# Patient Record
Sex: MALE | Race: WHITE | NOT HISPANIC OR LATINO
[De-identification: names, ages, dates, MRNs, and addresses within clinical notes are randomized per-mention and may not be internally consistent; named-entity substitution may affect disease eponyms.]

---

## 2017-08-29 ENCOUNTER — APPOINTMENT (OUTPATIENT)
Dept: INTERNAL MEDICINE | Facility: CLINIC | Age: 42
End: 2017-08-29
Payer: COMMERCIAL

## 2017-08-29 ENCOUNTER — NON-APPOINTMENT (OUTPATIENT)
Age: 42
End: 2017-08-29

## 2017-08-29 VITALS
HEIGHT: 71 IN | OXYGEN SATURATION: 98 % | SYSTOLIC BLOOD PRESSURE: 110 MMHG | HEART RATE: 86 BPM | TEMPERATURE: 97.8 F | BODY MASS INDEX: 25.62 KG/M2 | DIASTOLIC BLOOD PRESSURE: 60 MMHG | WEIGHT: 183 LBS

## 2017-08-29 DIAGNOSIS — E80.4 GILBERT SYNDROME: ICD-10-CM

## 2017-08-29 DIAGNOSIS — Z23 ENCOUNTER FOR IMMUNIZATION: ICD-10-CM

## 2017-08-29 PROCEDURE — 36415 COLL VENOUS BLD VENIPUNCTURE: CPT

## 2017-08-29 PROCEDURE — 93000 ELECTROCARDIOGRAM COMPLETE: CPT

## 2017-08-29 PROCEDURE — 82270 OCCULT BLOOD FECES: CPT

## 2017-08-29 PROCEDURE — 99396 PREV VISIT EST AGE 40-64: CPT | Mod: 25

## 2017-09-08 ENCOUNTER — TRANSCRIPTION ENCOUNTER (OUTPATIENT)
Age: 42
End: 2017-09-08

## 2017-11-17 ENCOUNTER — APPOINTMENT (OUTPATIENT)
Dept: GASTROENTEROLOGY | Facility: CLINIC | Age: 42
End: 2017-11-17
Payer: COMMERCIAL

## 2017-11-17 VITALS
TEMPERATURE: 97.6 F | RESPIRATION RATE: 16 BRPM | BODY MASS INDEX: 25.62 KG/M2 | HEART RATE: 92 BPM | OXYGEN SATURATION: 98 % | HEIGHT: 71 IN | SYSTOLIC BLOOD PRESSURE: 124 MMHG | WEIGHT: 183 LBS | DIASTOLIC BLOOD PRESSURE: 80 MMHG

## 2017-11-17 PROCEDURE — 99204 OFFICE O/P NEW MOD 45 MIN: CPT

## 2018-01-08 ENCOUNTER — CHART COPY (OUTPATIENT)
Age: 43
End: 2018-01-08

## 2018-01-10 ENCOUNTER — APPOINTMENT (OUTPATIENT)
Dept: GASTROENTEROLOGY | Facility: AMBULATORY MEDICAL SERVICES | Age: 43
End: 2018-01-10
Payer: COMMERCIAL

## 2018-01-10 PROCEDURE — 45385 COLONOSCOPY W/LESION REMOVAL: CPT

## 2018-02-06 LAB
25(OH)D3 SERPL-MCNC: 33.2 NG/ML
ALBUMIN SERPL ELPH-MCNC: 4.5 G/DL
ALP BLD-CCNC: 57 U/L
ALT SERPL-CCNC: 46 U/L
ANION GAP SERPL CALC-SCNC: 12 MMOL/L
APPEARANCE: CLEAR
AST SERPL-CCNC: 35 U/L
BACTERIA: NEGATIVE
BASOPHILS # BLD AUTO: 0.03 K/UL
BASOPHILS NFR BLD AUTO: 0.4 %
BILIRUB SERPL-MCNC: 0.8 MG/DL
BILIRUBIN URINE: NEGATIVE
BLOOD URINE: NEGATIVE
BUN SERPL-MCNC: 17 MG/DL
CALCIUM SERPL-MCNC: 8.9 MG/DL
CHLORIDE SERPL-SCNC: 101 MMOL/L
CHOLEST SERPL-MCNC: 231 MG/DL
CHOLEST/HDLC SERPL: 3.4 RATIO
CO2 SERPL-SCNC: 25 MMOL/L
COLOR: YELLOW
CREAT SERPL-MCNC: 1.16 MG/DL
EOSINOPHIL # BLD AUTO: 0.12 K/UL
EOSINOPHIL NFR BLD AUTO: 1.7 %
GLUCOSE QUALITATIVE U: NORMAL MG/DL
GLUCOSE SERPL-MCNC: 93 MG/DL
HBA1C MFR BLD HPLC: 5.4 %
HCT VFR BLD CALC: 43.1 %
HDLC SERPL-MCNC: 67 MG/DL
HGB BLD-MCNC: 14 G/DL
IMM GRANULOCYTES NFR BLD AUTO: 0.3 %
KETONES URINE: NEGATIVE
LDLC SERPL CALC-MCNC: 124 MG/DL
LEUKOCYTE ESTERASE URINE: NEGATIVE
LYMPHOCYTES # BLD AUTO: 2.37 K/UL
LYMPHOCYTES NFR BLD AUTO: 33.1 %
MAN DIFF?: NORMAL
MCHC RBC-ENTMCNC: 29.9 PG
MCHC RBC-ENTMCNC: 32.5 GM/DL
MCV RBC AUTO: 92.1 FL
MICROSCOPIC-UA: NORMAL
MONOCYTES # BLD AUTO: 0.45 K/UL
MONOCYTES NFR BLD AUTO: 6.3 %
NEUTROPHILS # BLD AUTO: 4.16 K/UL
NEUTROPHILS NFR BLD AUTO: 58.2 %
NITRITE URINE: NEGATIVE
PH URINE: 6.5
PLATELET # BLD AUTO: 279 K/UL
POTASSIUM SERPL-SCNC: 3.7 MMOL/L
PROT SERPL-MCNC: 7.3 G/DL
PROTEIN URINE: NEGATIVE MG/DL
RBC # BLD: 4.68 M/UL
RBC # FLD: 13.1 %
RED BLOOD CELLS URINE: 1 /HPF
SODIUM SERPL-SCNC: 138 MMOL/L
SPECIFIC GRAVITY URINE: 1.01
SQUAMOUS EPITHELIAL CELLS: 0 /HPF
T4 FREE SERPL-MCNC: 1.3 NG/DL
TRIGL SERPL-MCNC: 200 MG/DL
TSH SERPL-ACNC: 1.89 UIU/ML
UROBILINOGEN URINE: NORMAL MG/DL
WBC # FLD AUTO: 7.15 K/UL
WHITE BLOOD CELLS URINE: 0 /HPF

## 2019-01-01 ENCOUNTER — TRANSCRIPTION ENCOUNTER (OUTPATIENT)
Age: 44
End: 2019-01-01

## 2019-01-07 ENCOUNTER — TRANSCRIPTION ENCOUNTER (OUTPATIENT)
Age: 44
End: 2019-01-07

## 2019-02-13 ENCOUNTER — OUTPATIENT (OUTPATIENT)
Dept: OUTPATIENT SERVICES | Facility: HOSPITAL | Age: 44
LOS: 1 days | End: 2019-02-13
Payer: COMMERCIAL

## 2019-02-13 ENCOUNTER — APPOINTMENT (OUTPATIENT)
Dept: MRI IMAGING | Facility: CLINIC | Age: 44
End: 2019-02-13
Payer: COMMERCIAL

## 2019-02-13 DIAGNOSIS — Z00.8 ENCOUNTER FOR OTHER GENERAL EXAMINATION: ICD-10-CM

## 2019-02-13 PROCEDURE — 72141 MRI NECK SPINE W/O DYE: CPT | Mod: 26

## 2019-02-13 PROCEDURE — 72141 MRI NECK SPINE W/O DYE: CPT

## 2019-07-30 ENCOUNTER — APPOINTMENT (OUTPATIENT)
Dept: INTERNAL MEDICINE | Facility: CLINIC | Age: 44
End: 2019-07-30
Payer: COMMERCIAL

## 2019-07-30 VITALS
SYSTOLIC BLOOD PRESSURE: 124 MMHG | HEART RATE: 74 BPM | TEMPERATURE: 98.3 F | DIASTOLIC BLOOD PRESSURE: 86 MMHG | HEIGHT: 71 IN | OXYGEN SATURATION: 98 % | BODY MASS INDEX: 23.24 KG/M2 | WEIGHT: 166 LBS

## 2019-07-30 DIAGNOSIS — K35.32 ACUTE APPENDICITIS W/ PERFORATION AND LOCALIZED PERITONITIS, W/O ABSCESS: ICD-10-CM

## 2019-07-30 PROCEDURE — 99214 OFFICE O/P EST MOD 30 MIN: CPT | Mod: 25

## 2019-07-30 PROCEDURE — 36415 COLL VENOUS BLD VENIPUNCTURE: CPT

## 2019-07-30 RX ORDER — SODIUM SULFATE, POTASSIUM SULFATE, MAGNESIUM SULFATE 17.5; 3.13; 1.6 G/ML; G/ML; G/ML
17.5-3.13-1.6 SOLUTION, CONCENTRATE ORAL
Qty: 1 | Refills: 0 | Status: DISCONTINUED | COMMUNITY
Start: 2017-11-17 | End: 2019-07-30

## 2019-07-31 LAB
ALBUMIN SERPL ELPH-MCNC: 4.6 G/DL
ALP BLD-CCNC: 705 U/L
ALT SERPL-CCNC: 119 U/L
ANION GAP SERPL CALC-SCNC: 15 MMOL/L
AST SERPL-CCNC: 63 U/L
BASOPHILS # BLD AUTO: 0.07 K/UL
BASOPHILS NFR BLD AUTO: 0.5 %
BILIRUB SERPL-MCNC: 1.1 MG/DL
BUN SERPL-MCNC: 16 MG/DL
CALCIUM SERPL-MCNC: 10.1 MG/DL
CHLORIDE SERPL-SCNC: 97 MMOL/L
CO2 SERPL-SCNC: 25 MMOL/L
CREAT SERPL-MCNC: 1.09 MG/DL
EOSINOPHIL # BLD AUTO: 0.05 K/UL
EOSINOPHIL NFR BLD AUTO: 0.4 %
GLUCOSE SERPL-MCNC: 100 MG/DL
HCT VFR BLD CALC: 44.8 %
HGB BLD-MCNC: 14.1 G/DL
IMM GRANULOCYTES NFR BLD AUTO: 1.8 %
LYMPHOCYTES # BLD AUTO: 2.39 K/UL
LYMPHOCYTES NFR BLD AUTO: 17 %
MAN DIFF?: NORMAL
MCHC RBC-ENTMCNC: 29.7 PG
MCHC RBC-ENTMCNC: 31.5 GM/DL
MCV RBC AUTO: 94.5 FL
MONOCYTES # BLD AUTO: 0.75 K/UL
MONOCYTES NFR BLD AUTO: 5.3 %
NEUTROPHILS # BLD AUTO: 10.51 K/UL
NEUTROPHILS NFR BLD AUTO: 75 %
PLATELET # BLD AUTO: 449 K/UL
POTASSIUM SERPL-SCNC: 5.3 MMOL/L
PROT SERPL-MCNC: 7.8 G/DL
RBC # BLD: 4.74 M/UL
RBC # FLD: 13.8 %
SODIUM SERPL-SCNC: 137 MMOL/L
WBC # FLD AUTO: 14.02 K/UL

## 2019-07-31 NOTE — HISTORY OF PRESENT ILLNESS
[FreeTextEntry1] : Hospital follow-up [de-identified] : Patient was hospitalized in [place] ([facility]) from 7/20-7/24 for acute appendicitis s/p laparoscopic appendectomy. He initially developed severe periumbilical and RLQ abdominal pain. He had a ruptured appendix. He had a EVELINA drain placed and was treated with IV antibiotics. Pain was controlled with Dilaudid and Toradol. He was discharged home on Augmentin - to complete course tomorrow. He needs to have abdominal wall staples and sutures removed today. Was advised to have them removed 7-10 days after surgery. He feels better now. His abdominal pain has vastly improved although still has occasional pain in RLQ. His appetite has improved. He lost some weight since hospitalization. He has not had any evidence of abdominal wall infection. He is having normal bowel movements.

## 2019-07-31 NOTE — ASSESSMENT
[FreeTextEntry1] : Patient hospitalized in [place] with ruptured appendicitis s/p laparoscopic appendectomy on 7/20. He was discharged home on 7/24 and is now doing well. He will complete course of Augmentin tomorrow. Multiple staples and 1 suture on abdominal wall were successfully removed in office today - area was cleaned with alcohol pad and hydrogen peroxide. No need to keep areas bandaged. He will monitor for signs and symptoms of wound infection. Check CBC and CMP. Call office PRN.

## 2019-07-31 NOTE — HISTORY OF PRESENT ILLNESS
[FreeTextEntry1] : Hospital follow-up [de-identified] : Patient was hospitalized in [place] ([facility]) from 7/20-7/24 for acute appendicitis s/p laparoscopic appendectomy. He initially developed severe periumbilical and RLQ abdominal pain. He had a ruptured appendix. He had a EVELINA drain placed and was treated with IV antibiotics. Pain was controlled with Dilaudid and Toradol. He was discharged home on Augmentin - to complete course tomorrow. He needs to have abdominal wall staples and sutures removed today. Was advised to have them removed 7-10 days after surgery. He feels better now. His abdominal pain has vastly improved although still has occasional pain in RLQ. His appetite has improved. He lost some weight since hospitalization. He has not had any evidence of abdominal wall infection. He is having normal bowel movements.

## 2019-07-31 NOTE — REVIEW OF SYSTEMS
[Recent Change In Weight] : ~T recent weight change [Negative] : Respiratory [Fever] : no fever [Chills] : no chills [Fatigue] : no fatigue [Earache] : no earache [Nasal Discharge] : no nasal discharge [Sore Throat] : no sore throat [Chest Pain] : no chest pain [Palpitations] : no palpitations [Lower Ext Edema] : no lower extremity edema [Abdominal Pain] : no abdominal pain [Nausea] : no nausea [Constipation] : no constipation [Diarrhea] : no diarrhea [Heartburn] : no heartburn [Melena] : no melena [Dysuria] : no dysuria [Frequency] : no frequency [Hematuria] : no hematuria [Back Pain] : no back pain [Skin Rash] : no skin rash [Itching] : no itching [Dizziness] : no dizziness [Headache] : no headache [Unsteady Walk] : no ataxia [FreeTextEntry2] : lost weight

## 2019-07-31 NOTE — PHYSICAL EXAM
[No Acute Distress] : no acute distress [Normal] : normal rate, regular rhythm, normal S1 and S2 and no murmur heard [No Edema] : there was no peripheral edema [Non Tender] : non-tender [Non-distended] : non-distended [Soft] : abdomen soft [No HSM] : no HSM [Normal Bowel Sounds] : normal bowel sounds [No Rash] : no rash [Normal Gait] : normal gait [Normal Affect] : the affect was normal [Normal Mood] : the mood was normal [de-identified] : multiple staples place on abdominal wall especially near umbilicus, 1 suture placed in RLQ

## 2019-07-31 NOTE — REVIEW OF SYSTEMS
[Recent Change In Weight] : ~T recent weight change [Negative] : Respiratory [Fever] : no fever [Chills] : no chills [Fatigue] : no fatigue [Earache] : no earache [Nasal Discharge] : no nasal discharge [Sore Throat] : no sore throat [Chest Pain] : no chest pain [Palpitations] : no palpitations [Lower Ext Edema] : no lower extremity edema [Abdominal Pain] : no abdominal pain [Nausea] : no nausea [Constipation] : no constipation [Diarrhea] : no diarrhea [Melena] : no melena [Heartburn] : no heartburn [Dysuria] : no dysuria [Hematuria] : no hematuria [Frequency] : no frequency [Back Pain] : no back pain [Skin Rash] : no skin rash [Itching] : no itching [Dizziness] : no dizziness [Headache] : no headache [Unsteady Walk] : no ataxia [FreeTextEntry2] : lost weight

## 2019-07-31 NOTE — PHYSICAL EXAM
[No Acute Distress] : no acute distress [No Edema] : there was no peripheral edema [Normal] : normal rate, regular rhythm, normal S1 and S2 and no murmur heard [Non Tender] : non-tender [Non-distended] : non-distended [Soft] : abdomen soft [Normal Bowel Sounds] : normal bowel sounds [No HSM] : no HSM [No Rash] : no rash [Normal Gait] : normal gait [Normal Mood] : the mood was normal [Normal Affect] : the affect was normal [de-identified] : multiple staples place on abdominal wall especially near umbilicus, 1 suture placed in RLQ

## 2019-08-01 ENCOUNTER — OUTPATIENT (OUTPATIENT)
Dept: OUTPATIENT SERVICES | Facility: HOSPITAL | Age: 44
LOS: 1 days | End: 2019-08-01
Payer: COMMERCIAL

## 2019-08-01 ENCOUNTER — APPOINTMENT (OUTPATIENT)
Dept: CT IMAGING | Facility: CLINIC | Age: 44
End: 2019-08-01
Payer: COMMERCIAL

## 2019-08-01 ENCOUNTER — TRANSCRIPTION ENCOUNTER (OUTPATIENT)
Age: 44
End: 2019-08-01

## 2019-08-01 ENCOUNTER — INPATIENT (INPATIENT)
Facility: HOSPITAL | Age: 44
LOS: 3 days | Discharge: HOME | DRG: 443 | End: 2019-08-05
Attending: SURGERY | Admitting: SURGERY
Payer: COMMERCIAL

## 2019-08-01 VITALS
OXYGEN SATURATION: 99 % | SYSTOLIC BLOOD PRESSURE: 122 MMHG | DIASTOLIC BLOOD PRESSURE: 73 MMHG | WEIGHT: 164.91 LBS | HEART RATE: 66 BPM | RESPIRATION RATE: 17 BRPM | HEIGHT: 71 IN | TEMPERATURE: 98 F

## 2019-08-01 DIAGNOSIS — D72.829 ELEVATED WHITE BLOOD CELL COUNT, UNSPECIFIED: ICD-10-CM

## 2019-08-01 DIAGNOSIS — K35.32 ACUTE APPENDICITIS WITH PERFORATION, LOCALIZED PERITONITIS, AND GANGRENE, WITHOUT ABSCESS: ICD-10-CM

## 2019-08-01 DIAGNOSIS — Z90.49 ACQUIRED ABSENCE OF OTHER SPECIFIED PARTS OF DIGESTIVE TRACT: Chronic | ICD-10-CM

## 2019-08-01 DIAGNOSIS — K75.0 ABSCESS OF LIVER: ICD-10-CM

## 2019-08-01 LAB
ALBUMIN SERPL ELPH-MCNC: 4.3 G/DL — SIGNIFICANT CHANGE UP (ref 3.3–5)
ALP SERPL-CCNC: 557 U/L — HIGH (ref 40–120)
ALT FLD-CCNC: 98 U/L — HIGH (ref 10–45)
ANION GAP SERPL CALC-SCNC: 18 MMOL/L — HIGH (ref 5–17)
AST SERPL-CCNC: 42 U/L — HIGH (ref 10–40)
BASE EXCESS BLDV CALC-SCNC: 2.5 MMOL/L — HIGH (ref -2–2)
BASOPHILS # BLD AUTO: 0.1 K/UL — SIGNIFICANT CHANGE UP (ref 0–0.2)
BASOPHILS NFR BLD AUTO: 0.5 % — SIGNIFICANT CHANGE UP (ref 0–2)
BILIRUB SERPL-MCNC: 0.7 MG/DL — SIGNIFICANT CHANGE UP (ref 0.2–1.2)
BLD GP AB SCN SERPL QL: NEGATIVE — SIGNIFICANT CHANGE UP
BUN SERPL-MCNC: 16 MG/DL — SIGNIFICANT CHANGE UP (ref 7–23)
CA-I SERPL-SCNC: 1.21 MMOL/L — SIGNIFICANT CHANGE UP (ref 1.12–1.3)
CALCIUM SERPL-MCNC: 10.1 MG/DL — SIGNIFICANT CHANGE UP (ref 8.4–10.5)
CHLORIDE BLDV-SCNC: 104 MMOL/L — SIGNIFICANT CHANGE UP (ref 96–108)
CHLORIDE SERPL-SCNC: 97 MMOL/L — SIGNIFICANT CHANGE UP (ref 96–108)
CO2 BLDV-SCNC: 28 MMOL/L — SIGNIFICANT CHANGE UP (ref 22–30)
CO2 SERPL-SCNC: 24 MMOL/L — SIGNIFICANT CHANGE UP (ref 22–31)
CREAT SERPL-MCNC: 0.87 MG/DL — SIGNIFICANT CHANGE UP (ref 0.5–1.3)
EOSINOPHIL # BLD AUTO: 0.1 K/UL — SIGNIFICANT CHANGE UP (ref 0–0.5)
EOSINOPHIL NFR BLD AUTO: 1.1 % — SIGNIFICANT CHANGE UP (ref 0–6)
GAS PNL BLDV: 135 MMOL/L — SIGNIFICANT CHANGE UP (ref 135–145)
GAS PNL BLDV: SIGNIFICANT CHANGE UP
GAS PNL BLDV: SIGNIFICANT CHANGE UP
GLUCOSE BLDV-MCNC: 91 MG/DL — SIGNIFICANT CHANGE UP (ref 70–99)
GLUCOSE SERPL-MCNC: 90 MG/DL — SIGNIFICANT CHANGE UP (ref 70–99)
HCO3 BLDV-SCNC: 26 MMOL/L — SIGNIFICANT CHANGE UP (ref 21–29)
HCT VFR BLD CALC: 41.3 % — SIGNIFICANT CHANGE UP (ref 39–50)
HCT VFR BLDA CALC: 41 % — SIGNIFICANT CHANGE UP (ref 39–50)
HGB BLD CALC-MCNC: 13.3 G/DL — SIGNIFICANT CHANGE UP (ref 13–17)
HGB BLD-MCNC: 13.2 G/DL — SIGNIFICANT CHANGE UP (ref 13–17)
LACTATE BLDV-MCNC: 0.9 MMOL/L — SIGNIFICANT CHANGE UP (ref 0.7–2)
LYMPHOCYTES # BLD AUTO: 28.4 % — SIGNIFICANT CHANGE UP (ref 13–44)
LYMPHOCYTES # BLD AUTO: 3.1 K/UL — SIGNIFICANT CHANGE UP (ref 1–3.3)
MCHC RBC-ENTMCNC: 28.9 PG — SIGNIFICANT CHANGE UP (ref 27–34)
MCHC RBC-ENTMCNC: 32 GM/DL — SIGNIFICANT CHANGE UP (ref 32–36)
MCV RBC AUTO: 90.4 FL — SIGNIFICANT CHANGE UP (ref 80–100)
MONOCYTES # BLD AUTO: 0.6 K/UL — SIGNIFICANT CHANGE UP (ref 0–0.9)
MONOCYTES NFR BLD AUTO: 5.3 % — SIGNIFICANT CHANGE UP (ref 2–14)
NEUTROPHILS # BLD AUTO: 7 K/UL — SIGNIFICANT CHANGE UP (ref 1.8–7.4)
NEUTROPHILS NFR BLD AUTO: 64.8 % — SIGNIFICANT CHANGE UP (ref 43–77)
PCO2 BLDV: 40 MMHG — SIGNIFICANT CHANGE UP (ref 35–50)
PH BLDV: 7.44 — SIGNIFICANT CHANGE UP (ref 7.35–7.45)
PLATELET # BLD AUTO: 502 K/UL — HIGH (ref 150–400)
PO2 BLDV: 42 MMHG — SIGNIFICANT CHANGE UP (ref 25–45)
POTASSIUM BLDV-SCNC: 3.7 MMOL/L — SIGNIFICANT CHANGE UP (ref 3.5–5.3)
POTASSIUM SERPL-MCNC: 3.9 MMOL/L — SIGNIFICANT CHANGE UP (ref 3.5–5.3)
POTASSIUM SERPL-SCNC: 3.9 MMOL/L — SIGNIFICANT CHANGE UP (ref 3.5–5.3)
PROT SERPL-MCNC: 7.9 G/DL — SIGNIFICANT CHANGE UP (ref 6–8.3)
RBC # BLD: 4.57 M/UL — SIGNIFICANT CHANGE UP (ref 4.2–5.8)
RBC # FLD: 12.4 % — SIGNIFICANT CHANGE UP (ref 10.3–14.5)
RH IG SCN BLD-IMP: POSITIVE — SIGNIFICANT CHANGE UP
RH IG SCN BLD-IMP: POSITIVE — SIGNIFICANT CHANGE UP
SAO2 % BLDV: 76 % — SIGNIFICANT CHANGE UP (ref 67–88)
SODIUM SERPL-SCNC: 139 MMOL/L — SIGNIFICANT CHANGE UP (ref 135–145)
WBC # BLD: 10.8 K/UL — HIGH (ref 3.8–10.5)
WBC # FLD AUTO: 10.8 K/UL — HIGH (ref 3.8–10.5)

## 2019-08-01 PROCEDURE — 74177 CT ABD & PELVIS W/CONTRAST: CPT

## 2019-08-01 PROCEDURE — 71045 X-RAY EXAM CHEST 1 VIEW: CPT | Mod: 26

## 2019-08-01 PROCEDURE — 99285 EMERGENCY DEPT VISIT HI MDM: CPT

## 2019-08-01 PROCEDURE — 74177 CT ABD & PELVIS W/CONTRAST: CPT | Mod: 26

## 2019-08-01 RX ORDER — ENOXAPARIN SODIUM 100 MG/ML
40 INJECTION SUBCUTANEOUS DAILY
Refills: 0 | Status: DISCONTINUED | OUTPATIENT
Start: 2019-08-01 | End: 2019-08-01

## 2019-08-01 RX ORDER — DEXTROSE MONOHYDRATE, SODIUM CHLORIDE, AND POTASSIUM CHLORIDE 50; .745; 4.5 G/1000ML; G/1000ML; G/1000ML
1000 INJECTION, SOLUTION INTRAVENOUS
Refills: 0 | Status: DISCONTINUED | OUTPATIENT
Start: 2019-08-01 | End: 2019-08-02

## 2019-08-01 RX ORDER — ERTAPENEM SODIUM 1 G/1
1000 INJECTION, POWDER, LYOPHILIZED, FOR SOLUTION INTRAMUSCULAR; INTRAVENOUS EVERY 24 HOURS
Refills: 0 | Status: DISCONTINUED | OUTPATIENT
Start: 2019-08-01 | End: 2019-08-05

## 2019-08-01 RX ORDER — ERTAPENEM SODIUM 1 G/1
1000 INJECTION, POWDER, LYOPHILIZED, FOR SOLUTION INTRAMUSCULAR; INTRAVENOUS ONCE
Refills: 0 | Status: COMPLETED | OUTPATIENT
Start: 2019-08-01 | End: 2019-08-01

## 2019-08-01 RX ADMIN — ERTAPENEM SODIUM 120 MILLIGRAM(S): 1 INJECTION, POWDER, LYOPHILIZED, FOR SOLUTION INTRAMUSCULAR; INTRAVENOUS at 23:39

## 2019-08-01 NOTE — ED ADULT TRIAGE NOTE - CHIEF COMPLAINT QUOTE
CT scan showed liver abscess  had appendic rupture in [place] 2 weeks ago and was following up with MD and had CT scan done

## 2019-08-01 NOTE — ED PROVIDER NOTE - PROGRESS NOTE DETAILS
Dr Rosalino Ann MD, Facep Discussed with Dr Rosalino joyce surg/med for admit  Isacc Ann MD, Facep Dr. Kenney Tavarez, PGY1: Spoke with surgery resident. Advised patient may eat until midnight. Surgery team will call with more information after speaking with attending.

## 2019-08-01 NOTE — ED PROVIDER NOTE - OBJECTIVE STATEMENT
Patient is a 43 y/o male no pertinent PMH that presents to the ED with abdominal pain. Patient states he suffered a ruptured appendix on 7/20 while in [place] - received surgery and inpatient stay for IV abx. Patient states he had RLQ abdominal pain following d/c. He describes this as a "4/10 gnawing" pain. This pain then progressed into the RUQ, which he states is an intermittent sharp pain that is made worse with respirations. Patient was evaluated outpatient and had labs and abd CT on 8/1/19 showing a 3.2x2.3cm hypodensity in the liver. Patient is coming in for evaluation. Patient denies fever, chills, night sweats, headache, shortness of breath, difficulty in breathing, other abdominal pain, N/V/D/C, dysuria, hematuria, or numbness/tingling. Patient is a 45 y/o male no pertinent PMH that presents to the ED with abdominal pain. Patient states he suffered a ruptured appendix on 7/20 while in [place] - received surgery and inpatient stay for IV abx. Patient states he had continued RLQ abdominal pain following d/c. He describes this as a "4/10 gnawing" pain. This pain then progressed into the RUQ, which he states is an intermittent sharp pain that is made worse with respirations. Patient was evaluated outpatient and had labs and abd CT on 8/1/19 showing a 3.2x2.3cm hypodensity in the liver. Patient denies fever, chills, night sweats, headache, shortness of breath, difficulty in breathing, other abdominal pain, N/V/D/C, dysuria, hematuria, or numbness/tingling.

## 2019-08-01 NOTE — H&P ADULT - NSHPLABSRESULTS_GEN_ALL_CORE
13.2   10.8  )-----------( 502      ( 01 Aug 2019 21:32 )             41.3       08-01    139  |  97  |  16  ----------------------------<  90  3.9   |  24  |  0.87    Ca    10.1      01 Aug 2019 21:32    TPro  7.9  /  Alb  4.3  /  TBili  0.7  /  DBili  x   /  AST  42<H>  /  ALT  98<H>  /  AlkPhos  557<H>  08-01                      Lactate Trend            CAPILLARY BLOOD GLUCOSE    CT abdomen  IMPRESSION:     Status post appendectomy with mild inflammatory changes in the right   lower quadrant including trace fluid with peripheral enhancement in the   paracolic gutter which contains a calcification suspicious for dropped   stone. Additional phlegmonous changes just below the right diaphragm.    A 3.2 cm lesion in segment 6 of the liver suspicious for hepatic abscess.   Correlation with prior studies would prove useful.

## 2019-08-01 NOTE — ED PROVIDER NOTE - ATTENDING CONTRIBUTION TO CARE
PMD Flavio Jerry,  44y male pmh   ruptured appy sp Lap/appy 7/20/19 Nathaniel Anne, Pt did well dc 9d ago. complicted by drains. PT had labs showing elevated wbc and repeat ct showing liver abscess. Pt denies anorexia, no ever chills. nvdc, abd pain. Has mild rt abd with deep breath. PE WDWN male awake alert ncat neck supple chest clear ap abd mild rt mid quad tenderness to palp. cv no rgm,neruo no focal defects  Isacc Ann MD, Facep

## 2019-08-01 NOTE — H&P ADULT - NSHPREVIEWOFSYSTEMS_GEN_ALL_CORE
REVIEW OF SYSTEMS:    CONSTITUTIONAL: No weakness. Admits to low grade fever and occasional chills.  EYES/ENT: No visual changes;  No vertigo or throat pain   NECK: No pain or stiffness  RESPIRATORY: No cough, wheezing, hemoptysis; No shortness of breath  CARDIOVASCULAR: No chest pain or palpitations  GASTROINTESTINAL: Mild pain of right upper quadrant. No nausea, vomiting, or hematemesis; No diarrhea or constipation. No melena or hematochezia.  GENITOURINARY: No dysuria, frequency or hematuria  NEUROLOGICAL: No numbness or weakness  SKIN: No itching, burning, rashes, or lesions   All other review of systems is negative unless indicated above.

## 2019-08-01 NOTE — ED ADULT NURSE NOTE - OBJECTIVE STATEMENT
pt states, "last week while I was in [place], I went to the hospital and my appendix ruptured and I needed to have it taken out. I have been having pain on my right side on and off and I have been having regular f/u with my doctor. I had blood work that came back with high white blood cell count and elevated liver enzymes. I also had a CT scan today and the results came back saying that there is an abscess on my liver." pt denies any lightheadedness, dizziness, chest pain, SOB, n/v/d, numbness/tingling, or urinary symptoms at present.

## 2019-08-01 NOTE — H&P ADULT - NSHPPHYSICALEXAM_GEN_ALL_CORE
General: No acute distress.  Lungs: Unlabored breathing, on room air.   Heart: well perfused, warm to touch.   Abdomen: Soft, non-distended. Tender to palpation on right upper quadrant and right flank.

## 2019-08-01 NOTE — ED PROVIDER NOTE - CLINICAL SUMMARY MEDICAL DECISION MAKING FREE TEXT BOX
Dr. Kenney Tavarez, PGY1: Patient is a 43 y/o male that presents to the ED with worsening abdominal pain. Patient was treated at a hospital in [place] on 7/20 for ruptured appendix with surgery and IV abx. Outpatient lab work showed elevated alk phos, ALT/AST and CT abd revealed 3.2x2.3cm abscess in liver. RUQ TTP. Denies fever, chills, and N/V/D/C. Will repeat labs plus pre-op testing and consult surgery.

## 2019-08-01 NOTE — H&P ADULT - ASSESSMENT
A 44 year old s/p appendectomy with abdominal pain, fever, and chills for 5 days now found to have a hepatic abscess.    -Admit to Dr. Johnson  -NPO at midnight/IVF  -Pain control  -IV ertapenem  -Consult IR in the morning  -Hold DVT prophylaxis until IR procedure  -Plan discussed with Dr. Johnson    - ACS 1973

## 2019-08-01 NOTE — H&P ADULT - ATTENDING COMMENTS
Patient seen and examined  44 year old s/p laparoscopic appendectomy and wash out for perforated appendicitis in [place] on 7/20/19.  Now returns to ED c/o RUQ pain  On exam-  non-toxic appearing  awake, alert, oriented x 4  abd - + mild RUQ & rosamaria-incisional tenderness  WBC=10     - CT with lateral 3.2 x 2.3 cm hepatic abscess & free fluid in right paracolic gutter    - Admitted to surgical service, NPO, IV antibiotics  - Will discuss with VIR for possible drainage

## 2019-08-01 NOTE — H&P ADULT - HISTORY OF PRESENT ILLNESS
A 44 year old male with no past medical history s/p appendectomy and washout on 7/20/19 for perforated appendicitis with abdominal pain with fever and chills x 5 days. Patient was given IV antibiotics (unsure of which medication) for 5 days and was discharged with PO Augmentin for another 7 days. Patient states that he has been experiencing abdominal pain in the upper right quadrant that is dull and constant for 5 days. The patient states the pain is sharp when he breathes in or when he lays on his right side. Admits to low grade fever and occasional chills. Denies SOB, chest pain, N/V/D, or change in bowel habits.

## 2019-08-01 NOTE — ED ADULT NURSE NOTE - CHPI ED NUR SYMPTOMS NEG
no fever/no blood in stool/no hematuria/no nausea/no vomiting/no dysuria/no abdominal distension/no diarrhea/no burning urination

## 2019-08-02 DIAGNOSIS — Z79.899 OTHER LONG TERM (CURRENT) DRUG THERAPY: ICD-10-CM

## 2019-08-02 DIAGNOSIS — K75.0 ABSCESS OF LIVER: ICD-10-CM

## 2019-08-02 LAB
ALBUMIN SERPL ELPH-MCNC: 3.9 G/DL — SIGNIFICANT CHANGE UP (ref 3.3–5)
ALBUMIN SERPL ELPH-MCNC: 4.5 G/DL
ALP BLD-CCNC: 577 U/L
ALP SERPL-CCNC: 472 U/L — HIGH (ref 40–120)
ALT FLD-CCNC: 75 U/L — HIGH (ref 10–45)
ALT SERPL-CCNC: 93 U/L
ANION GAP SERPL CALC-SCNC: 13 MMOL/L — SIGNIFICANT CHANGE UP (ref 5–17)
ANION GAP SERPL CALC-SCNC: 16 MMOL/L
APTT BLD: 31.1 SEC — SIGNIFICANT CHANGE UP (ref 27.5–36.3)
AST SERPL-CCNC: 26 U/L — SIGNIFICANT CHANGE UP (ref 10–40)
AST SERPL-CCNC: 33 U/L
BASOPHILS # BLD AUTO: 0.08 K/UL
BASOPHILS NFR BLD AUTO: 0.9 %
BILIRUB SERPL-MCNC: 0.5 MG/DL — SIGNIFICANT CHANGE UP (ref 0.2–1.2)
BILIRUB SERPL-MCNC: 0.8 MG/DL
BUN SERPL-MCNC: 15 MG/DL — SIGNIFICANT CHANGE UP (ref 7–23)
BUN SERPL-MCNC: 18 MG/DL
CALCIUM SERPL-MCNC: 9.4 MG/DL — SIGNIFICANT CHANGE UP (ref 8.4–10.5)
CALCIUM SERPL-MCNC: 9.5 MG/DL
CHLORIDE SERPL-SCNC: 104 MMOL/L — SIGNIFICANT CHANGE UP (ref 96–108)
CHLORIDE SERPL-SCNC: 98 MMOL/L
CO2 SERPL-SCNC: 25 MMOL/L — SIGNIFICANT CHANGE UP (ref 22–31)
CO2 SERPL-SCNC: 26 MMOL/L
CREAT SERPL-MCNC: 0.94 MG/DL — SIGNIFICANT CHANGE UP (ref 0.5–1.3)
CREAT SERPL-MCNC: 1.05 MG/DL
EOSINOPHIL # BLD AUTO: 0.08 K/UL
EOSINOPHIL NFR BLD AUTO: 0.9 %
GLUCOSE SERPL-MCNC: 122 MG/DL — HIGH (ref 70–99)
GLUCOSE SERPL-MCNC: 94 MG/DL
HCT VFR BLD CALC: 38.3 % — LOW (ref 39–50)
HCT VFR BLD CALC: 40.6 %
HGB BLD-MCNC: 12.3 G/DL — LOW (ref 13–17)
HGB BLD-MCNC: 13.3 G/DL
IMM GRANULOCYTES NFR BLD AUTO: 2.8 %
INR BLD: 1.12 RATIO — SIGNIFICANT CHANGE UP (ref 0.88–1.16)
LYMPHOCYTES # BLD AUTO: 2.31 K/UL
LYMPHOCYTES NFR BLD AUTO: 26.3 %
MAGNESIUM SERPL-MCNC: 2.4 MG/DL — SIGNIFICANT CHANGE UP (ref 1.6–2.6)
MAN DIFF?: NORMAL
MCHC RBC-ENTMCNC: 29.2 PG — SIGNIFICANT CHANGE UP (ref 27–34)
MCHC RBC-ENTMCNC: 29.8 PG
MCHC RBC-ENTMCNC: 32.1 GM/DL — SIGNIFICANT CHANGE UP (ref 32–36)
MCHC RBC-ENTMCNC: 32.8 GM/DL
MCV RBC AUTO: 91 FL
MCV RBC AUTO: 91 FL — SIGNIFICANT CHANGE UP (ref 80–100)
MONOCYTES # BLD AUTO: 0.56 K/UL
MONOCYTES NFR BLD AUTO: 6.4 %
NEUTROPHILS # BLD AUTO: 5.51 K/UL
NEUTROPHILS NFR BLD AUTO: 62.7 %
PHOSPHATE SERPL-MCNC: 3.8 MG/DL — SIGNIFICANT CHANGE UP (ref 2.5–4.5)
PLATELET # BLD AUTO: 439 K/UL — HIGH (ref 150–400)
PLATELET # BLD AUTO: 512 K/UL
POTASSIUM SERPL-MCNC: 4.1 MMOL/L — SIGNIFICANT CHANGE UP (ref 3.5–5.3)
POTASSIUM SERPL-SCNC: 4.1 MMOL/L — SIGNIFICANT CHANGE UP (ref 3.5–5.3)
POTASSIUM SERPL-SCNC: 4.3 MMOL/L
PROT SERPL-MCNC: 7.4 G/DL — SIGNIFICANT CHANGE UP (ref 6–8.3)
PROT SERPL-MCNC: 7.7 G/DL
PROTHROM AB SERPL-ACNC: 12.7 SEC — SIGNIFICANT CHANGE UP (ref 10–13.1)
RBC # BLD: 4.21 M/UL — SIGNIFICANT CHANGE UP (ref 4.2–5.8)
RBC # BLD: 4.46 M/UL
RBC # FLD: 13 %
RBC # FLD: 13.2 % — SIGNIFICANT CHANGE UP (ref 10.3–14.5)
SODIUM SERPL-SCNC: 140 MMOL/L
SODIUM SERPL-SCNC: 142 MMOL/L — SIGNIFICANT CHANGE UP (ref 135–145)
WBC # BLD: 7.03 K/UL — SIGNIFICANT CHANGE UP (ref 3.8–10.5)
WBC # FLD AUTO: 7.03 K/UL — SIGNIFICANT CHANGE UP (ref 3.8–10.5)
WBC # FLD AUTO: 8.79 K/UL

## 2019-08-02 PROCEDURE — 99222 1ST HOSP IP/OBS MODERATE 55: CPT

## 2019-08-02 PROCEDURE — 99223 1ST HOSP IP/OBS HIGH 75: CPT

## 2019-08-02 RX ORDER — ACETAMINOPHEN 500 MG
650 TABLET ORAL ONCE
Refills: 0 | Status: COMPLETED | OUTPATIENT
Start: 2019-08-02 | End: 2019-08-02

## 2019-08-02 RX ORDER — ENOXAPARIN SODIUM 100 MG/ML
40 INJECTION SUBCUTANEOUS DAILY
Refills: 0 | Status: DISCONTINUED | OUTPATIENT
Start: 2019-08-02 | End: 2019-08-03

## 2019-08-02 RX ADMIN — Medication 650 MILLIGRAM(S): at 14:11

## 2019-08-02 RX ADMIN — ENOXAPARIN SODIUM 40 MILLIGRAM(S): 100 INJECTION SUBCUTANEOUS at 13:18

## 2019-08-02 RX ADMIN — ERTAPENEM SODIUM 120 MILLIGRAM(S): 1 INJECTION, POWDER, LYOPHILIZED, FOR SOLUTION INTRAMUSCULAR; INTRAVENOUS at 23:58

## 2019-08-02 RX ADMIN — DEXTROSE MONOHYDRATE, SODIUM CHLORIDE, AND POTASSIUM CHLORIDE 125 MILLILITER(S): 50; .745; 4.5 INJECTION, SOLUTION INTRAVENOUS at 08:53

## 2019-08-02 RX ADMIN — Medication 650 MILLIGRAM(S): at 13:41

## 2019-08-02 NOTE — CONSULT NOTE ADULT - PROBLEM SELECTOR RECOMMENDATION 9
Found to have lesion in liver suspicious for hepatic abscess   IR consulted, too small for drainage, monitor with serial abdominal exams  Monitor cbc   C/w IV ertapenem   tylenol prn for pain control   Sx follow up

## 2019-08-02 NOTE — CONSULT NOTE ADULT - SUBJECTIVE AND OBJECTIVE BOX
HPI:  A 44 year old male with no past medical history s/p appendectomy and washout on 7/20/19 for perforated appendicitis with abdominal pain with fever and chills x 5 days. Patient was given IV antibiotics (unsure of which medication) for 5 days and was discharged with PO Augmentin for another 7 days. Patient states that he has been experiencing abdominal pain in the upper right quadrant that is dull and constant for 5 days. Pt with CT A/P with findings of trace fluid with peripheral enhancement in the paracolic gutter which contains a calcification suspicious for dropped stone. Also with a 3.2 cm lesion in segment 6 of the liver suspicious for hepatic abscess. Currently reports he is feeling a little better, no n/v    PAST MEDICAL & SURGICAL HISTORY:  No pertinent past medical history  History of appendectomy  Hypospadias (ICD9 752.61): 1980 repair      Review of Systems:   CONSTITUTIONAL: No fever  EYES: No eye pain  ENMT:  No difficulty hearing  NECK: No pain   RESPIRATORY: No cough, wheezing, chills or hemoptysis; No shortness of breath  CARDIOVASCULAR: No chest pain, palpitations, dizziness, or leg swelling  GASTROINTESTINAL: abdominal pain   GENITOURINARY: No dysuria,  NEUROLOGICAL: No headaches  SKIN: No itching   LYMPH NODES: No enlarged glands  ENDOCRINE: No heat or cold intolerance; No hair loss  MUSCULOSKELETAL: No joint pain or swelling; No muscle, back, or extremity pain  PSYCHIATRIC: No depression  ALLERY AND IMMUNOLOGIC: No hives or eczema    Allergies    No Known Drug Allergies  seasonal allergies cause rhinits (Rhinitis)    Intolerances        Social History: denies smoking or etoh use     FAMILY HISTORY: romeo pertinenet       MEDICATIONS  (STANDING):  enoxaparin Injectable 40 milliGRAM(s) SubCutaneous daily  ertapenem  IVPB 1000 milliGRAM(s) IV Intermittent every 24 hours    MEDICATIONS  (PRN):        CAPILLARY BLOOD GLUCOSE        I&O's Summary    01 Aug 2019 07:01  -  02 Aug 2019 07:00  --------------------------------------------------------  IN: 875 mL / OUT: 0 mL / NET: 875 mL    02 Aug 2019 07:01  -  02 Aug 2019 13:32  --------------------------------------------------------  IN: 0 mL / OUT: 300 mL / NET: -300 mL        PHYSICAL EXAM:  GENERAL: NAD, well-developed  HEAD:  Atraumatic, Normocephalic  EYES: EOMI, PERRLA, conjunctiva and sclera clear  NECK: Supple, No JVD  CHEST/LUNG: Clear to auscultation bilaterally; No wheeze  HEART: Regular rate and rhythm; No murmurs, rubs, or gallops  ABDOMEN: Soft, rlq ttp  Nondistended; Bowel sounds present  EXTREMITIES:  2+ Peripheral Pulses, No clubbing, cyanosis, or edema  PSYCH: AAOx3  NEUROLOGY: non-focal  SKIN: No rashes or lesions    LABS:                        12.3   7.03  )-----------( 439      ( 02 Aug 2019 11:13 )             38.3     08-02    142  |  104  |  15  ----------------------------<  122<H>  4.1   |  25  |  0.94    Ca    9.4      02 Aug 2019 07:47  Phos  3.8     08-02  Mg     2.4     08-02    TPro  7.4  /  Alb  3.9  /  TBili  0.5  /  DBili  x   /  AST  26  /  ALT  75<H>  /  AlkPhos  472<H>  08-02    PT/INR - ( 02 Aug 2019 11:01 )   PT: 12.7 sec;   INR: 1.12 ratio         PTT - ( 02 Aug 2019 11:01 )  PTT:31.1 sec          RADIOLOGY & ADDITIONAL TESTS:    Imaging Personally Reviewed:    Consultant(s) Notes Reviewed:  sx    Care Discussed with Consultants/Other Providers: sx

## 2019-08-02 NOTE — CONSULT NOTE ADULT - ASSESSMENT
44 year old male with no past medical history s/p appendectomy and washout on 7/20/19 for perforated appendicitis presents with abdominal pain with fever and chills x 5 days. Found to have a 3.2 cm lesion in the liver suspicious for hepatic abscess

## 2019-08-02 NOTE — PROGRESS NOTE ADULT - SUBJECTIVE AND OBJECTIVE BOX
Trauma Surgery Daily Progress Note     45 yo Male 10 days s/p appendectomy with abdominal pain, fever, and chills for 5 days found to have a hepatic abscess.    --------------------------------------------------------------------------------------------------------------------  SUBJECTIVE / 24H EVENTS  Patient seen and examined on morning rounds.   Pain minimal. On Ertapenem  Afebrile. WBC 7    OBJECTIVE:  VITAL SIGNS:  T(C): 36.6 (08-02-19 @ 08:51), Max: 37.4 (08-01-19 @ 21:06)  HR: 56 (08-02-19 @ 08:51) (56 - 66)  BP: 104/64 (08-02-19 @ 08:51) (104/64 - 122/73)  RR: 18 (08-02-19 @ 08:51) (16 - 18)  SpO2: 98% (08-02-19 @ 08:51) (98% - 100%)  Daily Height in cm: 180.34 (01 Aug 2019 19:25)    Daily       PHYSICAL EXAM:  Gen: NAD  LS: Respirations unlabored  GI: Soft. Nontender. Nondistended. BS+.  Ext: Warm, well perfused      08-01-19 @ 07:01  -  08-02-19 @ 07:00  --------------------------------------------------------  IN:    dextrose 5% + sodium chloride 0.45% with potassium chloride 20 mEq/L: 875 mL  Total IN: 875 mL    OUT:  Total OUT: 0 mL    Total NET: 875 mL      08-02-19 @ 07:01  -  08-02-19 @ 12:11  --------------------------------------------------------  IN:  Total IN: 0 mL    OUT:    Voided: 300 mL  Total OUT: 300 mL    Total NET: -300 mL          LAB VALUES:  08-02    142  |  104  |  15  ----------------------------<  122<H>  4.1   |  25  |  0.94    Ca    9.4      02 Aug 2019 07:47  Phos  3.8     08-02  Mg     2.4     08-02    TPro  7.4  /  Alb  3.9  /  TBili  0.5  /  DBili  x   /  AST  26  /  ALT  75<H>  /  AlkPhos  472<H>  08-02                               12.3   7.03  )-----------( 439      ( 02 Aug 2019 11:13 )             38.3     LIVER FUNCTIONS - ( 02 Aug 2019 07:47 )  Alb: 3.9 g/dL / Pro: 7.4 g/dL / ALK PHOS: 472 U/L / ALT: 75 U/L / AST: 26 U/L / GGT: x           PT/INR - ( 02 Aug 2019 11:01 )   PT: 12.7 sec;   INR: 1.12 ratio         PTT - ( 02 Aug 2019 11:01 )  PTT:31.1 sec            MICROBIOLOGY:      RADIOLOGY:  PACS Image: Image(s) Available (08-01-19 @ 22:05)  PACS Image: Image(s) Available (08-01-19 @ 17:43)        MEDICATIONS  (STANDING):  ertapenem  IVPB 1000 milliGRAM(s) IV Intermittent every 24 hours    MEDICATIONS  (PRN): Surgery Daily Progress Note     45 yo Male 10 days s/p appendectomy with abdominal pain, fever, and chills for 5 days found to have a hepatic abscess.    --------------------------------------------------------------------------------------------------------------------  SUBJECTIVE / 24H EVENTS  Patient seen and examined on morning rounds.   Pain minimal. On Ertapenem  Afebrile. WBC 7    OBJECTIVE:  VITAL SIGNS:  T(C): 36.6 (08-02-19 @ 08:51), Max: 37.4 (08-01-19 @ 21:06)  HR: 56 (08-02-19 @ 08:51) (56 - 66)  BP: 104/64 (08-02-19 @ 08:51) (104/64 - 122/73)  RR: 18 (08-02-19 @ 08:51) (16 - 18)  SpO2: 98% (08-02-19 @ 08:51) (98% - 100%)  Daily Height in cm: 180.34 (01 Aug 2019 19:25)    Daily       PHYSICAL EXAM:  Gen: NAD  LS: Respirations unlabored  GI:  Soft, non-distended. Tender to palpation on right upper quadrant and right flank.  Ext: Warm, well perfused      08-01-19 @ 07:01  -  08-02-19 @ 07:00  --------------------------------------------------------  IN:    dextrose 5% + sodium chloride 0.45% with potassium chloride 20 mEq/L: 875 mL  Total IN: 875 mL    OUT:  Total OUT: 0 mL    Total NET: 875 mL      08-02-19 @ 07:01  -  08-02-19 @ 12:11  --------------------------------------------------------  IN:  Total IN: 0 mL    OUT:    Voided: 300 mL  Total OUT: 300 mL    Total NET: -300 mL          LAB VALUES:  08-02    142  |  104  |  15  ----------------------------<  122<H>  4.1   |  25  |  0.94    Ca    9.4      02 Aug 2019 07:47  Phos  3.8     08-02  Mg     2.4     08-02    TPro  7.4  /  Alb  3.9  /  TBili  0.5  /  DBili  x   /  AST  26  /  ALT  75<H>  /  AlkPhos  472<H>  08-02                               12.3   7.03  )-----------( 439      ( 02 Aug 2019 11:13 )             38.3     LIVER FUNCTIONS - ( 02 Aug 2019 07:47 )  Alb: 3.9 g/dL / Pro: 7.4 g/dL / ALK PHOS: 472 U/L / ALT: 75 U/L / AST: 26 U/L / GGT: x           PT/INR - ( 02 Aug 2019 11:01 )   PT: 12.7 sec;   INR: 1.12 ratio         PTT - ( 02 Aug 2019 11:01 )  PTT:31.1 sec            MICROBIOLOGY:      RADIOLOGY:  PACS Image: Image(s) Available (08-01-19 @ 22:05)  PACS Image: Image(s) Available (08-01-19 @ 17:43)        MEDICATIONS  (STANDING):  ertapenem  IVPB 1000 milliGRAM(s) IV Intermittent every 24 hours    MEDICATIONS  (PRN):

## 2019-08-02 NOTE — PROGRESS NOTE ADULT - SUBJECTIVE AND OBJECTIVE BOX
SURGERY DAILY PROGRESS NOTE    SUBJECTIVE: Pt seen and examined at bedside.  Pt c/o abdominal pain.  Denies n/v, chest pain,sob, palpitations, fever, chills.    OBJECTIVE:  Vital Signs Last 24 Hrs  T(C): 36.6 (02 Aug 2019 08:51), Max: 37.4 (01 Aug 2019 21:06)  T(F): 97.8 (02 Aug 2019 08:51), Max: 99.4 (01 Aug 2019 21:06)  HR: 56 (02 Aug 2019 08:51) (56 - 66)  BP: 104/64 (02 Aug 2019 08:51) (104/64 - 122/73)  BP(mean): --  RR: 18 (02 Aug 2019 08:51) (16 - 18)  SpO2: 98% (02 Aug 2019 08:51) (98% - 100%)    I&O's Summary    01 Aug 2019 07:01  -  02 Aug 2019 07:00  --------------------------------------------------------  IN: 875 mL / OUT: 0 mL / NET: 875 mL    02 Aug 2019 07:01  -  02 Aug 2019 12:11  --------------------------------------------------------  IN: 0 mL / OUT: 300 mL / NET: -300 mL        Physical Exam:  General Appearance: Appears well, NAD, A&O x3  Chest: Equal expansion bilaterally, equal breath sounds  Abdomen: Soft, non-distended. Tender to palpation on right upper quadrant and right flank.  Extremities: Grossly symmetric, SCD's in place     LABS:                        12.3   7.03  )-----------( 439      ( 02 Aug 2019 11:13 )             38.3     08-02    142  |  104  |  15  ----------------------------<  122<H>  4.1   |  25  |  0.94    Ca    9.4      02 Aug 2019 07:47  Phos  3.8     08-02  Mg     2.4     08-02    TPro  7.4  /  Alb  3.9  /  TBili  0.5  /  DBili  x   /  AST  26  /  ALT  75<H>  /  AlkPhos  472<H>  08-02    PT/INR - ( 02 Aug 2019 11:01 )   PT: 12.7 sec;   INR: 1.12 ratio         PTT - ( 02 Aug 2019 11:01 )  PTT:31.1 sec      RADIOLOGY & ADDITIONAL STUDIES:

## 2019-08-02 NOTE — PROGRESS NOTE ADULT - ASSESSMENT
A/P: 43 yo Male 10 days s/p appendectomy with abdominal pain, fever, and chills for 5 days found to have a hepatic abscess    - A/P: 43 yo Male 10 days s/p appendectomy with abdominal pain, fever, and chills for 5 days found to have a hepatic abscess    - IR consulted: too small for drainage, if clinically worsens, repeat scan and re-consult  - IV abx  - Reg diet  - DVT ppx  - AM labs    Surgery, [id number]

## 2019-08-02 NOTE — PROGRESS NOTE ADULT - ASSESSMENT
ASSESSMENT/PLAN: 44 year old s/p appendectomy with abdominal pain, fever, and chills for 5 days now found to have a hepatic abscess  - IR consulted: too small for drainage, if clinically worsens, repeat scan and re-consult  - IV abx  - Reg diet  - DVT ppx  - AM labs    Surgery  [id number]

## 2019-08-03 LAB
ANION GAP SERPL CALC-SCNC: 13 MMOL/L — SIGNIFICANT CHANGE UP (ref 5–17)
BUN SERPL-MCNC: 12 MG/DL — SIGNIFICANT CHANGE UP (ref 7–23)
CALCIUM SERPL-MCNC: 9.7 MG/DL — SIGNIFICANT CHANGE UP (ref 8.4–10.5)
CHLORIDE SERPL-SCNC: 99 MMOL/L — SIGNIFICANT CHANGE UP (ref 96–108)
CO2 SERPL-SCNC: 24 MMOL/L — SIGNIFICANT CHANGE UP (ref 22–31)
CREAT SERPL-MCNC: 0.89 MG/DL — SIGNIFICANT CHANGE UP (ref 0.5–1.3)
GLUCOSE SERPL-MCNC: 111 MG/DL — HIGH (ref 70–99)
HCT VFR BLD CALC: 39.6 % — SIGNIFICANT CHANGE UP (ref 39–50)
HGB BLD-MCNC: 12.7 G/DL — LOW (ref 13–17)
MAGNESIUM SERPL-MCNC: 2.3 MG/DL — SIGNIFICANT CHANGE UP (ref 1.6–2.6)
MCHC RBC-ENTMCNC: 29.1 PG — SIGNIFICANT CHANGE UP (ref 27–34)
MCHC RBC-ENTMCNC: 32.1 GM/DL — SIGNIFICANT CHANGE UP (ref 32–36)
MCV RBC AUTO: 90.8 FL — SIGNIFICANT CHANGE UP (ref 80–100)
PHOSPHATE SERPL-MCNC: 3.4 MG/DL — SIGNIFICANT CHANGE UP (ref 2.5–4.5)
PLATELET # BLD AUTO: 454 K/UL — HIGH (ref 150–400)
POTASSIUM SERPL-MCNC: 4 MMOL/L — SIGNIFICANT CHANGE UP (ref 3.5–5.3)
POTASSIUM SERPL-SCNC: 4 MMOL/L — SIGNIFICANT CHANGE UP (ref 3.5–5.3)
RBC # BLD: 4.36 M/UL — SIGNIFICANT CHANGE UP (ref 4.2–5.8)
RBC # FLD: 13 % — SIGNIFICANT CHANGE UP (ref 10.3–14.5)
SODIUM SERPL-SCNC: 136 MMOL/L — SIGNIFICANT CHANGE UP (ref 135–145)
WBC # BLD: 9.26 K/UL — SIGNIFICANT CHANGE UP (ref 3.8–10.5)
WBC # FLD AUTO: 9.26 K/UL — SIGNIFICANT CHANGE UP (ref 3.8–10.5)

## 2019-08-03 PROCEDURE — 99222 1ST HOSP IP/OBS MODERATE 55: CPT | Mod: GC

## 2019-08-03 PROCEDURE — 71045 X-RAY EXAM CHEST 1 VIEW: CPT | Mod: 26

## 2019-08-03 PROCEDURE — 99232 SBSQ HOSP IP/OBS MODERATE 35: CPT

## 2019-08-03 RX ADMIN — ERTAPENEM SODIUM 120 MILLIGRAM(S): 1 INJECTION, POWDER, LYOPHILIZED, FOR SOLUTION INTRAMUSCULAR; INTRAVENOUS at 23:44

## 2019-08-03 RX ADMIN — ENOXAPARIN SODIUM 40 MILLIGRAM(S): 100 INJECTION SUBCUTANEOUS at 12:42

## 2019-08-03 NOTE — CONSULT NOTE ADULT - ATTENDING COMMENTS
improved on in antibiotics      suggest:  place PICC line  Ertapenem 1 gm iv daily 8/1-->   x 4 weeks through 8/29/19  weekly CBC, CMP while on IV Antibiotics  repeat imaging in 3 weeks improved on in antibiotics      suggest:  place PICC line  Ertapenem 1 gm iv daily 8/1-->   x 4 weeks through 8/29/19  weekly CBC, CMP while on IV Antibiotics  repeat imaging in 3 weeks    discussed at length with patient  signs and symptoms of PICC site infection discussed  I am happy to follow patient in my office if needed: Office phone is [phone]/Fax: [phone]    Mele Rand MD

## 2019-08-03 NOTE — CONSULT NOTE ADULT - ASSESSMENT
A 44 year old male with no past medical history s/p appendectomy and washout on 7/20/19 for perforated appendicitis, found to have a hepatic abscess too small to be drained by IR.     Hepatic abscess  - Continue with ertapenem for a total of 14 days  - Place a PICC line  - Follow up with Dr Rand in ID clinic in 10 days for repeat imaging   - ID will continue to follow    Nancy Henderson, PGY-4  Infectious Disease Fellow   Pager: [phone]  After 5pm/weekends: [phone] A 44 year old male with no past medical history s/p appendectomy and washout on 7/20/19 for perforated appendicitis, found to have a hepatic abscess too small to be drained by IR.     Hepatic abscess  - Continue with ertapenem for a total of 28 days  - Place a PICC line  - Follow up with Dr Rand in ID clinic in 21 days for repeat imaging   - ID will continue to follow    Nancy Henderson, PGY-4  Infectious Disease Fellow   Pager: [phone]  After 5pm/weekends: [phone]

## 2019-08-03 NOTE — PROGRESS NOTE ADULT - SUBJECTIVE AND OBJECTIVE BOX
Surgery Daily Progress Note     44 year old s/p appendectomy with abdominal pain, fever, and chills for 5 days now found to have a hepatic abscess    --------------------------------------------------------------------------------------------------------------------  SUBJECTIVE / 24H EVENTS  Patient seen and examined on morning rounds. No acute events overnight.  Pt denies abdominal pain.  Denies N/V, chest pain, SOB, palpitations, fever, chills.  Pt OOB, ambulating  Tolerating regular diet    OBJECTIVE:  VITAL SIGNS:  T(C): 37.1 (08-03-19 @ 09:40), Max: 37.1 (08-02-19 @ 21:23)  HR: 60 (08-03-19 @ 09:40) (56 - 67)  BP: 108/70 (08-03-19 @ 09:40) (103/66 - 121/72)  RR: 17 (08-03-19 @ 09:40) (17 - 18)  SpO2: 97% (08-03-19 @ 09:40) (97% - 98%)  Daily     Daily       PHYSICAL EXAM:  General: Appears well, NAD, A&O x3  Chest: Equal expansion bilaterally, equal breath sounds  Abdomen: Soft, non-distended. Minimal tenderness to palpation RUQ and right flank.  Extremities: Warm, Pulses 2+      08-02-19 @ 07:01  -  08-03-19 @ 07:00  --------------------------------------------------------  IN:    dextrose 5% + sodium chloride 0.45% with potassium chloride 20 mEq/L: 625 mL    IV PiggyBack: 50 mL    Oral Fluid: 1200 mL  Total IN: 1875 mL    OUT:    Voided: 4100 mL  Total OUT: 4100 mL    Total NET: -2225 mL      08-03-19 @ 07:01  -  08-03-19 @ 12:27  --------------------------------------------------------  IN:    Oral Fluid: 240 mL  Total IN: 240 mL    OUT:    Voided: 1400 mL  Total OUT: 1400 mL    Total NET: -1160 mL          LAB VALUES:  08-03    136  |  99  |  12  ----------------------------<  111<H>  4.0   |  24  |  0.89    Ca    9.7      03 Aug 2019 07:26  Phos  3.4     08-03  Mg     2.3     08-03    TPro  7.4  /  Alb  3.9  /  TBili  0.5  /  DBili  x   /  AST  26  /  ALT  75<H>  /  AlkPhos  472<H>  08-02                               12.7   9.26  )-----------( 454      ( 03 Aug 2019 10:54 )             39.6     LIVER FUNCTIONS - ( 02 Aug 2019 07:47 )  Alb: 3.9 g/dL / Pro: 7.4 g/dL / ALK PHOS: 472 U/L / ALT: 75 U/L / AST: 26 U/L / GGT: x           PT/INR - ( 02 Aug 2019 11:01 )   PT: 12.7 sec;   INR: 1.12 ratio         PTT - ( 02 Aug 2019 11:01 )  PTT:31.1 sec            MICROBIOLOGY:      RADIOLOGY:        MEDICATIONS  (STANDING):  enoxaparin Injectable 40 milliGRAM(s) SubCutaneous daily  ertapenem  IVPB 1000 milliGRAM(s) IV Intermittent every 24 hours    MEDICATIONS  (PRN):

## 2019-08-03 NOTE — CONSULT NOTE ADULT - SUBJECTIVE AND OBJECTIVE BOX
INFECTIOUS DISEASE SERVICE INITIAL CONSULTATION NOTE    HPI:  A 44 year old male with no past medical history s/p appendectomy and washout on 7/20/19 for perforated appendicitis with abdominal pain with fever and chills x 5 days. Patient was given IV antibiotics (unsure of which medication) for 5 days and was discharged with PO Augmentin for another 7 days. Patient states that he has been experiencing abdominal pain in the upper right quadrant that is dull and constant for 5 days. The patient states the pain is sharp when he breathes in or when he lays on his right side. Admits to low grade fever and occasional chills. Denies SOB, chest pain, N/V/D, or change in bowel habits. (01 Aug 2019 23:12)      PAST MEDICAL & SURGICAL HISTORY:  No pertinent past medical history  History of appendectomy  Hypospadias (ICD9 752.61): 1980 repair      REVIEW OF SYSTEMS:  Constitutional: no weakness, +fevers, +chills  Dermatologic: no rash  Respiratory: no SOB, no cough, +pleuritic chest pain on right   Cardiovascular: no chest pain, no palpitations  Gastrointestinal: no nausea, no vomiting, no diarrhea  Genitourinary: no dysuria, no urinary frequency, no hematuria, no urinary retention  Musculoskeletal:	no weakness, no joint swelling/pain  Neurological: no focal weakness or numbness  Endocrine: no polyuria, no polydipsia    ACTIVE ANTIMICROBIAL/ANTIBIOTIC MEDICATIONS:  ertapenem  IVPB 1000 milliGRAM(s) IV Intermittent every 24 hours      OTHER MEDICATIONS:  enoxaparin Injectable 40 milliGRAM(s) SubCutaneous daily      ALLERGIES:  Allergies    No Known Drug Allergies  seasonal allergies cause rhinits (Rhinitis)    Intolerances        SOCIAL HISTORY:    FAMILY HISTORY: Lives with his wife and sons, nonsmoker, social alcohol use, no drugs       VITAL SIGNS:  ICU Vital Signs Last 24 Hrs  T(C): 37.1 (03 Aug 2019 09:40), Max: 37.1 (02 Aug 2019 21:23)  T(F): 98.7 (03 Aug 2019 09:40), Max: 98.7 (02 Aug 2019 21:23)  HR: 60 (03 Aug 2019 09:40) (56 - 62)  BP: 108/70 (03 Aug 2019 09:40) (103/66 - 121/72)  BP(mean): --  ABP: --  ABP(mean): --  RR: 17 (03 Aug 2019 09:40) (17 - 18)  SpO2: 97% (03 Aug 2019 09:40) (97% - 98%)      PHYSICAL EXAM:  Constitutional: WDWN  Head: NC/AT  Eyes: PERRLA, EOMI; anicteric slcera  ENMT: no rhinorrhea; no sinus tenderness on palpation; no oropharyngeal lesions, erythema, or exudates	  Neck: supple; no JVD or LAD  Respiratory: CTA B/L  Cardiovascular: +S1/S2, RRR; no appreciable murmurs  Gastrointestinal: soft, NT/ND; +BSx4, no HSM; few healing incisions (umbilicus, R lateral lower abdomen, midline lower abdomen) all without drainage or erythema    Extremities: WWP; no clubbing, cyanosis, or edema  Vascular: 2+ radial, DP/PT pulses B/L  Dermatologic: skin warm and dry; no visible rashes or lesions  Neurologic: AAOx3; no focal deficits    LABS:                        12.7   9.26  )-----------( 454      ( 03 Aug 2019 10:54 )             39.6     08-03    136  |  99  |  12  ----------------------------<  111<H>  4.0   |  24  |  0.89    Ca    9.7      03 Aug 2019 07:26  Phos  3.4     08-03  Mg     2.3     08-03    TPro  7.4  /  Alb  3.9  /  TBili  0.5  /  DBili  x   /  AST  26  /  ALT  75<H>  /  AlkPhos  472<H>  08-02    PT/INR - ( 02 Aug 2019 11:01 )   PT: 12.7 sec;   INR: 1.12 ratio         PTT - ( 02 Aug 2019 11:01 )  PTT:31.1 sec      MICROBIOLOGY:      RADIOLOGY & ADDITIONAL STUDIES:    < from: CT Abdomen and Pelvis w/ Oral Cont and w/ IV Cont (08.01.19 @ 17:43) >  EXAM:  CT ABDOMEN AND PELVIS OC IC        PROCEDURE DATE:  08/01/2019           INTERPRETATION:  CLINICAL INFORMATION: Status post appendectomy on   7/20/2019 for rupture appendicitis now with persistent leukocytosis and   transaminitis.    COMPARISON: None.    PROCEDURE:   CT of the Abdomen and Pelvis was performed with intravenous contrast.   Intravenous contrast: 90 ml Omnipaque 350. 10 ml discarded.  Oral contrast: positive contrast was administered.  Sagittal and coronal reformats were performed.    FINDINGS:    LOWER CHEST: Right basilar linear type atelectasis.    LIVER: A 3.2 x 2.3 cm hypodense lesion in segment 6 measures higher than   simple fluid and demonstrates a slightly thickened wall. Findings raise   concern for abscess. Correlation with prior imaging studies would prove   useful. An additional subcentimeter hypodense focus in segment 7 is too   small to characterize.  BILE DUCTS: Normal caliber.  GALLBLADDER: Within normal limits.  SPLEEN: Within normal limits.  PANCREAS: Within normal limits.  ADRENALS: Within normal limits.  KIDNEYS/URETERS: Within normal limits.    BLADDER: Within normal limits.  REPRODUCTIVE ORGANS: Prostate within normal limits.    BOWEL/PERITONEUM: No bowel obstruction. Appendectomy. Mild inflammatory   changes including a small amount of fluid with peripheral enhancement in   the right paracolic gutter. This fluid contains a punctate calcification   (3:60) concerning for a small dropped appendicolith. Heterogeneous   density material at the capsular surface of the hepatic dome on the right   (3:6) suggestive of phlegmon. No ascites or drainable peritoneal   collection.  VESSELS: Atherosclerotic changes.  RETROPERITONEUM/LYMPH NODES: No lymphadenopathy.    ABDOMINAL WALL: Postsurgicalchanges.  BONES: Within normal limits.    IMPRESSION:     Status post appendectomy with mild inflammatory changes in the right   lower quadrant including trace fluid with peripheral enhancement in the   paracolic gutter which contains a calcificationsuspicious for dropped   stone. Additional phlegmonous changes just below the right diaphragm.    A 3.2 cm lesion in segment 6 of the liver suspicious for hepatic abscess.   Correlation with prior studies would prove useful.    These findings were discussed with Dr. Flavio Jerry at 8/1/2019 6:15 PM   by Dr. Jennings with read back confirmation.                THOMAS JENNINGS M.D., RADIOLOGY RESIDENT  This document has been electronically signed.  ARMIN PEDROZA M.D., ATTENDING RADIOLOGIST   This document has been electronically signed. Aug  1 2019  6:57PM    < end of copied text > INFECTIOUS DISEASE SERVICE INITIAL CONSULTATION NOTE    HPI:  A 44 year old male with no past medical history s/p appendectomy and washout on 7/20/19 for perforated appendicitis with abdominal pain with fever and chills x 5 days. Patient was given IV antibiotics (unsure of which medication) for 5 days and was discharged with PO Augmentin for another 7 days. Patient states that he has been experiencing abdominal pain in the upper right quadrant that is dull and constant for 5 days. The patient states the pain is sharp when he breathes in or when he lays on his right side. Admits to low grade fever and occasional chills. Denies SOB, chest pain, N/V/D, or change in bowel habits. (01 Aug 2019 23:12)      PAST MEDICAL & SURGICAL HISTORY:  No pertinent past medical history  History of appendectomy  Hypospadias (ICD9 752.61): 1980 repair      REVIEW OF SYSTEMS:  Constitutional: no weakness, +fevers, +chills  Dermatologic: no rash  Respiratory: no SOB, no cough, +pleuritic chest pain on right   Cardiovascular: no chest pain, no palpitations  Gastrointestinal: no nausea, no vomiting, no diarrhea  Genitourinary: no dysuria, no urinary frequency, no hematuria, no urinary retention  Musculoskeletal:	no weakness, no joint swelling/pain  Neurological: no focal weakness or numbness  Endocrine: no polyuria, no polydipsia    ACTIVE ANTIMICROBIAL/ANTIBIOTIC MEDICATIONS:  ertapenem  IVPB 1000 milliGRAM(s) IV Intermittent every 24 hours      OTHER MEDICATIONS:  enoxaparin Injectable 40 milliGRAM(s) SubCutaneous daily      ALLERGIES:  Allergies    No Known Drug Allergies  seasonal allergies cause rhinits (Rhinitis)    Intolerances        SOCIAL HISTORY: Lives with his wife and sons, nonsmoker, social alcohol use, no drugs     FAMILY HISTORY: Father and sister have Factor V Leiden       VITAL SIGNS:  ICU Vital Signs Last 24 Hrs  T(C): 37.1 (03 Aug 2019 09:40), Max: 37.1 (02 Aug 2019 21:23)  T(F): 98.7 (03 Aug 2019 09:40), Max: 98.7 (02 Aug 2019 21:23)  HR: 60 (03 Aug 2019 09:40) (56 - 62)  BP: 108/70 (03 Aug 2019 09:40) (103/66 - 121/72)  BP(mean): --  ABP: --  ABP(mean): --  RR: 17 (03 Aug 2019 09:40) (17 - 18)  SpO2: 97% (03 Aug 2019 09:40) (97% - 98%)      PHYSICAL EXAM:  Constitutional: WDWN  Head: NC/AT  Eyes: PERRLA, EOMI; anicteric sclera  ENMT: no rhinorrhea; no sinus tenderness on palpation; no oropharyngeal lesions, erythema, or exudates	  Neck: supple; no JVD or LAD  Respiratory: CTA B/L  Cardiovascular: +S1/S2, RRR; no appreciable murmurs  Gastrointestinal: soft, NT/ND; +BSx4, no HSM; few healing incisions (umbilicus, R lateral lower abdomen, midline lower abdomen) all without drainage or erythema    Extremities: WWP; no clubbing, cyanosis, or edema  Vascular: 2+ radial, DP/PT pulses B/L  Dermatologic: skin warm and dry; no visible rashes or lesions  Neurologic: AAOx3; no focal deficits    LABS:                        12.7   9.26  )-----------( 454      ( 03 Aug 2019 10:54 )             39.6     08-03    136  |  99  |  12  ----------------------------<  111<H>  4.0   |  24  |  0.89    Ca    9.7      03 Aug 2019 07:26  Phos  3.4     08-03  Mg     2.3     08-03    TPro  7.4  /  Alb  3.9  /  TBili  0.5  /  DBili  x   /  AST  26  /  ALT  75<H>  /  AlkPhos  472<H>  08-02    PT/INR - ( 02 Aug 2019 11:01 )   PT: 12.7 sec;   INR: 1.12 ratio         PTT - ( 02 Aug 2019 11:01 )  PTT:31.1 sec      MICROBIOLOGY:      RADIOLOGY & ADDITIONAL STUDIES:    < from: CT Abdomen and Pelvis w/ Oral Cont and w/ IV Cont (08.01.19 @ 17:43) >  EXAM:  CT ABDOMEN AND PELVIS OC IC        PROCEDURE DATE:  08/01/2019           INTERPRETATION:  CLINICAL INFORMATION: Status post appendectomy on   7/20/2019 for rupture appendicitis now with persistent leukocytosis and   transaminitis.    COMPARISON: None.    PROCEDURE:   CT of the Abdomen and Pelvis was performed with intravenous contrast.   Intravenous contrast: 90 ml Omnipaque 350. 10 ml discarded.  Oral contrast: positive contrast was administered.  Sagittal and coronal reformats were performed.    FINDINGS:    LOWER CHEST: Right basilar linear type atelectasis.    LIVER: A 3.2 x 2.3 cm hypodense lesion in segment 6 measures higher than   simple fluid and demonstrates a slightly thickened wall. Findings raise   concern for abscess. Correlation with prior imaging studies would prove   useful. An additional subcentimeter hypodense focus in segment 7 is too   small to characterize.  BILE DUCTS: Normal caliber.  GALLBLADDER: Within normal limits.  SPLEEN: Within normal limits.  PANCREAS: Within normal limits.  ADRENALS: Within normal limits.  KIDNEYS/URETERS: Within normal limits.    BLADDER: Within normal limits.  REPRODUCTIVE ORGANS: Prostate within normal limits.    BOWEL/PERITONEUM: No bowel obstruction. Appendectomy. Mild inflammatory   changes including a small amount of fluid with peripheral enhancement in   the right paracolic gutter. This fluid contains a punctate calcification   (3:60) concerning for a small dropped appendicolith. Heterogeneous   density material at the capsular surface of the hepatic dome on the right   (3:6) suggestive of phlegmon. No ascites or drainable peritoneal   collection.  VESSELS: Atherosclerotic changes.  RETROPERITONEUM/LYMPH NODES: No lymphadenopathy.    ABDOMINAL WALL: Postsurgicalchanges.  BONES: Within normal limits.    IMPRESSION:     Status post appendectomy with mild inflammatory changes in the right   lower quadrant including trace fluid with peripheral enhancement in the   paracolic gutter which contains a calcificationsuspicious for dropped   stone. Additional phlegmonous changes just below the right diaphragm.    A 3.2 cm lesion in segment 6 of the liver suspicious for hepatic abscess.   Correlation with prior studies would prove useful.    These findings were discussed with Dr. Flavio Jerry at 8/1/2019 6:15 PM   by Dr. Jennings with read back confirmation.                THOMAS JENNINGS M.D., RADIOLOGY RESIDENT  This document has been electronically signed.  ARMIN PEDROZA M.D., ATTENDING RADIOLOGIST   This document has been electronically signed. Aug  1 2019  6:57PM    < end of copied text > INFECTIOUS DISEASE SERVICE INITIAL CONSULTATION NOTE    HPI:  A 44 year old male with no past medical history s/p appendectomy and washout on 7/20/19 for perforated appendicitis with abdominal pain with fever and chills x 5 days. Patient was given IV antibiotics (unsure of which medication) for 5 days and was discharged with PO Augmentin for another 7 days. Patient states that he has been experiencing abdominal pain in the upper right quadrant that is dull and constant for 5 days. The patient states the pain is sharp when he breathes in or when he lays on his right side. Admits to low grade fever and occasional chills. Denies SOB, chest pain, N/V/D, or change in bowel habits. (01 Aug 2019 23:12)      PAST MEDICAL & SURGICAL HISTORY:  No pertinent past medical history  History of appendectomy  Hypospadias (ICD9 752.61): 1980 repair      REVIEW OF SYSTEMS:  Constitutional: no weakness, +fevers, +chills  Dermatologic: no rash  Respiratory: no SOB, no cough, +pleuritic chest pain on right   Cardiovascular: no chest pain, no palpitations  Gastrointestinal: no nausea, no vomiting, no diarrhea  Genitourinary: no dysuria, no urinary frequency, no hematuria, no urinary retention  Musculoskeletal:	no weakness, no joint swelling/pain  Neurological: no focal weakness or numbness  Endocrine: no polyuria, no polydipsia    ACTIVE ANTIMICROBIAL/ANTIBIOTIC MEDICATIONS:  ertapenem  IVPB 1000 milliGRAM(s) IV Intermittent every 24 hours      OTHER MEDICATIONS:  enoxaparin Injectable 40 milliGRAM(s) SubCutaneous daily      ALLERGIES:  Allergies    No Known Drug Allergies  seasonal allergies cause rhinits (Rhinitis)        SOCIAL HISTORY: Lives with his wife and sons, nonsmoker, social alcohol use, no drugs     FAMILY HISTORY: Father and sister have Factor V Leiden       VITAL SIGNS:  ICU Vital Signs Last 24 Hrs  T(C): 37.1 (03 Aug 2019 09:40), Max: 37.1 (02 Aug 2019 21:23)  T(F): 98.7 (03 Aug 2019 09:40), Max: 98.7 (02 Aug 2019 21:23)  HR: 60 (03 Aug 2019 09:40) (56 - 62)  BP: 108/70 (03 Aug 2019 09:40) (103/66 - 121/72)  BP(mean): --  ABP: --  ABP(mean): --  RR: 17 (03 Aug 2019 09:40) (17 - 18)  SpO2: 97% (03 Aug 2019 09:40) (97% - 98%)      PHYSICAL EXAM:  Constitutional: WDWN  Head: NC/AT  Eyes: PERRLA, EOMI; anicteric sclera  ENMT: no rhinorrhea; no sinus tenderness on palpation; no oropharyngeal lesions, erythema, or exudates	  Neck: supple; no JVD or LAD  Respiratory: CTA B/L  Cardiovascular: +S1/S2, RRR; no appreciable murmurs  Gastrointestinal: soft, NT/ND; +BSx4, no HSM; few healing incisions (umbilicus, R lateral lower abdomen, midline lower abdomen) all without drainage or erythema    Extremities: WWP; no clubbing, cyanosis, or edema  Vascular: 2+ radial, DP/PT pulses B/L  Dermatologic: skin warm and dry; no visible rashes or lesions  Neurologic: AAOx3; no focal deficits    LABS:                        12.7   9.26  )-----------( 454      ( 03 Aug 2019 10:54 )             39.6   WBC Count: 9.26 (08-03 @ 10:54)  WBC Count: 7.03 (08-02 @ 11:13)  WBC Count: 10.8 (08-01 @ 21:32)    08-03    136  |  99  |  12  ----------------------------<  111<H>  4.0   |  24  |  0.89    Ca    9.7      03 Aug 2019 07:26  Phos  3.4     08-03  Mg     2.3     08-03    TPro  7.4  /  Alb  3.9  /  TBili  0.5  /  DBili  x   /  AST  26  /  ALT  75<H>  /  AlkPhos  472<H>  08-02    PT/INR - ( 02 Aug 2019 11:01 )   PT: 12.7 sec;   INR: 1.12 ratio         PTT - ( 02 Aug 2019 11:01 )  PTT:31.1 sec      MICROBIOLOGY:      RADIOLOGY & ADDITIONAL STUDIES:    < from: CT Abdomen and Pelvis w/ Oral Cont and w/ IV Cont (08.01.19 @ 17:43) >  EXAM:  CT ABDOMEN AND PELVIS OC IC        PROCEDURE DATE:  08/01/2019           INTERPRETATION:  CLINICAL INFORMATION: Status post appendectomy on   7/20/2019 for rupture appendicitis now with persistent leukocytosis and   transaminitis.    COMPARISON: None.    PROCEDURE:   CT of the Abdomen and Pelvis was performed with intravenous contrast.   Intravenous contrast: 90 ml Omnipaque 350. 10 ml discarded.  Oral contrast: positive contrast was administered.  Sagittal and coronal reformats were performed.    FINDINGS:    LOWER CHEST: Right basilar linear type atelectasis.    LIVER: A 3.2 x 2.3 cm hypodense lesion in segment 6 measures higher than   simple fluid and demonstrates a slightly thickened wall. Findings raise   concern for abscess. Correlation with prior imaging studies would prove   useful. An additional subcentimeter hypodense focus in segment 7 is too   small to characterize.  BILE DUCTS: Normal caliber.  GALLBLADDER: Within normal limits.  SPLEEN: Within normal limits.  PANCREAS: Within normal limits.  ADRENALS: Within normal limits.  KIDNEYS/URETERS: Within normal limits.    BLADDER: Within normal limits.  REPRODUCTIVE ORGANS: Prostate within normal limits.    BOWEL/PERITONEUM: No bowel obstruction. Appendectomy. Mild inflammatory   changes including a small amount of fluid with peripheral enhancement in   the right paracolic gutter. This fluid contains a punctate calcification   (3:60) concerning for a small dropped appendicolith. Heterogeneous   density material at the capsular surface of the hepatic dome on the right   (3:6) suggestive of phlegmon. No ascites or drainable peritoneal   collection.  VESSELS: Atherosclerotic changes.  RETROPERITONEUM/LYMPH NODES: No lymphadenopathy.    ABDOMINAL WALL: Postsurgicalchanges.  BONES: Within normal limits.    IMPRESSION:     Status post appendectomy with mild inflammatory changes in the right   lower quadrant including trace fluid with peripheral enhancement in the   paracolic gutter which contains a calcificationsuspicious for dropped   stone. Additional phlegmonous changes just below the right diaphragm.    A 3.2 cm lesion in segment 6 of the liver suspicious for hepatic abscess.   Correlation with prior studies would prove useful.    These findings were discussed with Dr. Flavio Jerry at 8/1/2019 6:15 PM   by Dr. Jennings with read back confirmation.                THOMAS JENNINGS M.D., RADIOLOGY RESIDENT  This document has been electronically signed.  ARMIN PEDROZA M.D., ATTENDING RADIOLOGIST   This document has been electronically signed. Aug  1 2019  6:57PM    < end of copied text >

## 2019-08-03 NOTE — PROGRESS NOTE ADULT - ASSESSMENT
A/P: 44 year old s/p appendectomy with abdominal pain, fever, and chills for 5 days now found to have a hepatic abscess    - IR consulted: too small for drainage, if clinically worsens, repeat scan and re-consult  - ID consulted for oral vs IV discharge recs  - Considering PICC line  - Reg diet  - DVT ppx  - AM labs (WBC 9)

## 2019-08-04 LAB
ANION GAP SERPL CALC-SCNC: 15 MMOL/L — SIGNIFICANT CHANGE UP (ref 5–17)
BUN SERPL-MCNC: 13 MG/DL — SIGNIFICANT CHANGE UP (ref 7–23)
CALCIUM SERPL-MCNC: 9.7 MG/DL — SIGNIFICANT CHANGE UP (ref 8.4–10.5)
CHLORIDE SERPL-SCNC: 101 MMOL/L — SIGNIFICANT CHANGE UP (ref 96–108)
CO2 SERPL-SCNC: 26 MMOL/L — SIGNIFICANT CHANGE UP (ref 22–31)
CREAT SERPL-MCNC: 0.98 MG/DL — SIGNIFICANT CHANGE UP (ref 0.5–1.3)
GLUCOSE SERPL-MCNC: 114 MG/DL — HIGH (ref 70–99)
HCT VFR BLD CALC: 39.3 % — SIGNIFICANT CHANGE UP (ref 39–50)
HGB BLD-MCNC: 13 G/DL — SIGNIFICANT CHANGE UP (ref 13–17)
MAGNESIUM SERPL-MCNC: 2.4 MG/DL — SIGNIFICANT CHANGE UP (ref 1.6–2.6)
MCHC RBC-ENTMCNC: 29.9 PG — SIGNIFICANT CHANGE UP (ref 27–34)
MCHC RBC-ENTMCNC: 33.1 GM/DL — SIGNIFICANT CHANGE UP (ref 32–36)
MCV RBC AUTO: 90.3 FL — SIGNIFICANT CHANGE UP (ref 80–100)
PHOSPHATE SERPL-MCNC: 4.2 MG/DL — SIGNIFICANT CHANGE UP (ref 2.5–4.5)
PLATELET # BLD AUTO: 473 K/UL — HIGH (ref 150–400)
POTASSIUM SERPL-MCNC: 3.9 MMOL/L — SIGNIFICANT CHANGE UP (ref 3.5–5.3)
POTASSIUM SERPL-SCNC: 3.9 MMOL/L — SIGNIFICANT CHANGE UP (ref 3.5–5.3)
RBC # BLD: 4.35 M/UL — SIGNIFICANT CHANGE UP (ref 4.2–5.8)
RBC # FLD: 12.9 % — SIGNIFICANT CHANGE UP (ref 10.3–14.5)
SODIUM SERPL-SCNC: 142 MMOL/L — SIGNIFICANT CHANGE UP (ref 135–145)
WBC # BLD: 7.16 K/UL — SIGNIFICANT CHANGE UP (ref 3.8–10.5)
WBC # FLD AUTO: 7.16 K/UL — SIGNIFICANT CHANGE UP (ref 3.8–10.5)

## 2019-08-04 PROCEDURE — 99232 SBSQ HOSP IP/OBS MODERATE 35: CPT

## 2019-08-04 RX ORDER — CHLORHEXIDINE GLUCONATE 213 G/1000ML
1 SOLUTION TOPICAL DAILY
Refills: 0 | Status: DISCONTINUED | OUTPATIENT
Start: 2019-08-04 | End: 2019-08-05

## 2019-08-04 RX ADMIN — CHLORHEXIDINE GLUCONATE 1 APPLICATION(S): 213 SOLUTION TOPICAL at 10:18

## 2019-08-04 NOTE — PROGRESS NOTE ADULT - ASSESSMENT
A/P: 44 year old s/p appendectomy with abdominal pain, fever, and chills for 5 days now found to have a hepatic abscess    - Appreciate ID recs  - PICC line in place, CXR confirming posistion  - Reg diet  - DVT ppx  - AM labs (WBC 9)

## 2019-08-04 NOTE — PROGRESS NOTE ADULT - SUBJECTIVE AND OBJECTIVE BOX
Surgery Daily Progress Note     44 year old s/p appendectomy with abdominal pain, fever, and chills for 5 days now found to have a hepatic abscess    --------------------------------------------------------------------------------------------------------------------  SUBJECTIVE / 24H EVENTS  Patient seen and examined on morning rounds. No acute events overnight.  PICC line placed. CXR confirming position  Pt denies abdominal pain.  Denies N/V, chest pain, SOB, palpitations, fever, chills.  Pt OOB, ambulating  Tolerating regular diet    OBJECTIVE:  Vital Signs Last 24 Hrs  T(C): 36.7 (04 Aug 2019 04:25), Max: 37.1 (03 Aug 2019 09:40)  T(F): 98 (04 Aug 2019 04:25), Max: 98.7 (03 Aug 2019 09:40)  HR: 58 (04 Aug 2019 04:25) (58 - 66)  BP: 103/69 (04 Aug 2019 04:25) (101/67 - 109/60)  BP(mean): --  RR: 18 (04 Aug 2019 04:25) (17 - 18)  SpO2: 99% (04 Aug 2019 04:25) (95% - 99%)      PHYSICAL EXAM:  General: Appears well, NAD, A&O x3  Chest: Equal expansion bilaterally, equal breath sounds  Abdomen: Soft, non-distended. Minimal tenderness to palpation RUQ and right flank.  Extremities: Warm, Pulses 2+    ** I&O's **    02 Aug 2019 07:01  -  03 Aug 2019 07:00  --------------------------------------------------------  IN:    dextrose 5% + sodium chloride 0.45% with potassium chloride 20 mEq/L: 625 mL    IV PiggyBack: 50 mL    Oral Fluid: 1200 mL  Total IN: 1875 mL    OUT:    Voided: 4100 mL  Total OUT: 4100 mL    Total NET: -2225 mL      03 Aug 2019 07:01  -  04 Aug 2019 06:28  --------------------------------------------------------  IN:    IV PiggyBack: 50 mL    Oral Fluid: 1060 mL  Total IN: 1110 mL    OUT:    Voided: 4750 mL  Total OUT: 4750 mL    Total NET: -3640 mL          ** LABS **                        12.7   9.26  )-----------( 454      ( 03 Aug 2019 10:54 )             39.6     03 Aug 2019 07:26    136    |  99     |  12     ----------------------------<  111    4.0     |  24     |  0.89     Ca    9.7        03 Aug 2019 07:26  Phos  3.4       03 Aug 2019 07:26  Mg     2.3       03 Aug 2019 07:26    TPro  7.4    /  Alb  3.9    /  TBili  0.5    /  DBili  x      /  AST  26     /  ALT  75     /  AlkPhos  472    02 Aug 2019 07:47    PT/INR - ( 02 Aug 2019 11:01 )   PT: 12.7 sec;   INR: 1.12 ratio         PTT - ( 02 Aug 2019 11:01 )  PTT:31.1 sec  CAPILLARY BLOOD GLUCOSE            LIVER FUNCTIONS - ( 02 Aug 2019 07:47 )  Alb: 3.9 g/dL / Pro: 7.4 g/dL / ALK PHOS: 472 U/L / ALT: 75 U/L / AST: 26 U/L / GGT: x                 MEDICATIONS  (STANDING):  ertapenem  IVPB 1000 milliGRAM(s) IV Intermittent every 24 hours    MEDICATIONS  (PRN):

## 2019-08-05 ENCOUNTER — TRANSCRIPTION ENCOUNTER (OUTPATIENT)
Age: 44
End: 2019-08-05

## 2019-08-05 ENCOUNTER — CHART COPY (OUTPATIENT)
Age: 44
End: 2019-08-05

## 2019-08-05 VITALS
TEMPERATURE: 98 F | OXYGEN SATURATION: 98 % | RESPIRATION RATE: 17 BRPM | SYSTOLIC BLOOD PRESSURE: 124 MMHG | HEART RATE: 61 BPM | DIASTOLIC BLOOD PRESSURE: 78 MMHG

## 2019-08-05 PROCEDURE — 86850 RBC ANTIBODY SCREEN: CPT

## 2019-08-05 PROCEDURE — 85610 PROTHROMBIN TIME: CPT

## 2019-08-05 PROCEDURE — 82435 ASSAY OF BLOOD CHLORIDE: CPT

## 2019-08-05 PROCEDURE — 86901 BLOOD TYPING SEROLOGIC RH(D): CPT

## 2019-08-05 PROCEDURE — 85027 COMPLETE CBC AUTOMATED: CPT

## 2019-08-05 PROCEDURE — 99285 EMERGENCY DEPT VISIT HI MDM: CPT

## 2019-08-05 PROCEDURE — 85730 THROMBOPLASTIN TIME PARTIAL: CPT

## 2019-08-05 PROCEDURE — 86900 BLOOD TYPING SEROLOGIC ABO: CPT

## 2019-08-05 PROCEDURE — 84132 ASSAY OF SERUM POTASSIUM: CPT

## 2019-08-05 PROCEDURE — 82803 BLOOD GASES ANY COMBINATION: CPT

## 2019-08-05 PROCEDURE — 36569 INSJ PICC 5 YR+ W/O IMAGING: CPT

## 2019-08-05 PROCEDURE — 82330 ASSAY OF CALCIUM: CPT

## 2019-08-05 PROCEDURE — 82947 ASSAY GLUCOSE BLOOD QUANT: CPT

## 2019-08-05 PROCEDURE — 80048 BASIC METABOLIC PNL TOTAL CA: CPT

## 2019-08-05 PROCEDURE — 85014 HEMATOCRIT: CPT

## 2019-08-05 PROCEDURE — C1751: CPT

## 2019-08-05 PROCEDURE — 80053 COMPREHEN METABOLIC PANEL: CPT

## 2019-08-05 PROCEDURE — 83735 ASSAY OF MAGNESIUM: CPT

## 2019-08-05 PROCEDURE — 84295 ASSAY OF SERUM SODIUM: CPT

## 2019-08-05 PROCEDURE — 83605 ASSAY OF LACTIC ACID: CPT

## 2019-08-05 PROCEDURE — 71045 X-RAY EXAM CHEST 1 VIEW: CPT

## 2019-08-05 PROCEDURE — 99232 SBSQ HOSP IP/OBS MODERATE 35: CPT

## 2019-08-05 PROCEDURE — 84100 ASSAY OF PHOSPHORUS: CPT

## 2019-08-05 PROCEDURE — 99238 HOSP IP/OBS DSCHRG MGMT 30/<: CPT

## 2019-08-05 RX ORDER — ERTAPENEM SODIUM 1 G/1
1 INJECTION, POWDER, LYOPHILIZED, FOR SOLUTION INTRAMUSCULAR; INTRAVENOUS
Qty: 0 | Refills: 0 | DISCHARGE
Start: 2019-08-05

## 2019-08-05 RX ADMIN — ERTAPENEM SODIUM 120 MILLIGRAM(S): 1 INJECTION, POWDER, LYOPHILIZED, FOR SOLUTION INTRAMUSCULAR; INTRAVENOUS at 00:58

## 2019-08-05 NOTE — DISCHARGE NOTE PROVIDER - NSDCFUSCHEDAPPT_GEN_ALL_CORE_FT
ALIYAH BONILLA ; 10/28/2019 ; [facility] ALIYAH BONILLA ; 10/28/2019 ; NPP Med Int [facility]

## 2019-08-05 NOTE — PROGRESS NOTE ADULT - ASSESSMENT
A/P: 44 year old s/p appendectomy with abdominal pain, fever, and chills for 5 days now found to have a hepatic abscess    - Appreciate ID recs  - PICC line in place  - Discharge planning

## 2019-08-05 NOTE — PROGRESS NOTE ADULT - SUBJECTIVE AND OBJECTIVE BOX
Surgery Daily Progress Note     44 year old s/p appendectomy with abdominal pain, fever, and chills for 5 days now found to have a hepatic abscess    --------------------------------------------------------------------------------------------------------------------  SUBJECTIVE / 24H EVENTS  Patient seen and examined on morning rounds. No acute events overnight.  PICC line in place  Pt denies abdominal pain.  Denies N/V, chest pain, SOB, palpitations, fever, chills.  Pt OOB, ambulating  Tolerating regular diet    OBJECTIVE:  Vital Signs Last 24 Hrs  T(C): 36.4 (05 Aug 2019 05:58), Max: 37.1 (04 Aug 2019 21:36)  T(F): 97.5 (05 Aug 2019 05:58), Max: 98.7 (04 Aug 2019 21:36)  HR: 52 (05 Aug 2019 05:58) (52 - 73)  BP: 107/- (05 Aug 2019 05:58) (107/- - 117/74)  BP(mean): --  RR: 18 (05 Aug 2019 05:58) (17 - 18)  SpO2: 99% (05 Aug 2019 05:58) (96% - 99%)      PHYSICAL EXAM:  General: Appears well, NAD, A&O x3  Chest: Equal expansion bilaterally, equal breath sounds  Abdomen: Soft, non-distended. Minimal tenderness to palpation RUQ and right flank.  Extremities: Warm, Pulses 2+    ** I&O's **    04 Aug 2019 07:01  -  05 Aug 2019 07:00  --------------------------------------------------------  IN:    IV PiggyBack: 50 mL    Oral Fluid: 1180 mL  Total IN: 1230 mL    OUT:    Voided: 3400 mL  Total OUT: 3400 mL    Total NET: -2170 mL          ** LABS **                        13.0   7.16  )-----------( 473      ( 04 Aug 2019 08:26 )             39.3     04 Aug 2019 05:57    142    |  101    |  13     ----------------------------<  114    3.9     |  26     |  0.98     Ca    9.7        04 Aug 2019 05:57  Phos  4.2       04 Aug 2019 05:57  Mg     2.4       04 Aug 2019 05:57        CAPILLARY BLOOD GLUCOSE                    MEDICATIONS  (STANDING):  ertapenem  IVPB 1000 milliGRAM(s) IV Intermittent every 24 hours    MEDICATIONS  (PRN):  chlorhexidine 2% Cloths 1 Application(s) Topical daily PRN Cleaning

## 2019-08-05 NOTE — DISCHARGE NOTE NURSING/CASE MANAGEMENT/SOCIAL WORK - NSSCTYPOFSERV_GEN_ALL_CORE
Home infusion services for IV antibiotic, skilled RN teaching visits, PICC line care. RN will call to schedule visit for dose due 8/6.

## 2019-08-05 NOTE — DISCHARGE NOTE PROVIDER - PROVIDER TOKENS
PROVIDER:[TOKEN:[[id number]],FOLLOWUP:[2 weeks]],PROVIDER:[TOKEN:[[id number]],FOLLOWUP:[2 weeks]]

## 2019-08-05 NOTE — DISCHARGE NOTE PROVIDER - CARE PROVIDERS DIRECT ADDRESSES
[email or web address]

## 2019-08-05 NOTE — DISCHARGE NOTE PROVIDER - NSDCFUADDINST_GEN_ALL_CORE_FT
Please follow up with Dr Rand in ID clinic in 21 days for repeat imaging. office if needed: Office phone is [phone]/Fax: [phone]

## 2019-08-05 NOTE — DISCHARGE NOTE PROVIDER - CARE PROVIDER_API CALL
Michael Choe)  Surgery; Surgical Critical Care  [address] [place] [address]  Phone: [phone]  Fax: [phone]  Follow Up Time: 2 weeks    Mele Rand)  Internal Medicine  [address] [place] [address]  Phone: [phone]  Fax: [phone]  Follow Up Time: 2 weeks

## 2019-08-05 NOTE — PROGRESS NOTE ADULT - ASSESSMENT
A 44 year old male with no past medical history s/p appendectomy and washout on 7/20/19 for perforated appendicitis, admitted 8/1/19 and found to have a hepatic abscess too small to be drained by IR.     < from: CT Abdomen and Pelvis w/ Oral Cont and w/ IV Cont (08.01.19 @ 17:43) >  LIVER: A 3.2 x 2.3 cm hypodense lesion in segment 6 measures higher than   simple fluid and demonstrates a slightly thickened wall. Findings raise   concern for abscess.     s/p  PICC line  Ertapenem 1 gm iv daily 8/1-->   x 4 weeks through 8/29/19  weekly CBC, CMP while on IV Antibiotics  repeat imaging in 3 weeks    appears well  tolerating antibiotics  instructions reinforced

## 2019-08-05 NOTE — PROGRESS NOTE ADULT - SUBJECTIVE AND OBJECTIVE BOX
Follow Up:  liver abscess after perf AP    Interval History/ROS: comfortable, good appetite, denies abd pain    Allergies  No Known Drug Allergies  seasonal allergies cause rhinits (Rhinitis)    ANTIMICROBIALS:  ertapenem  IVPB 1000 every 24 hours    OTHER MEDS:  MEDICATIONS  (STANDING):      Vital Signs Last 24 Hrs  T(C): 36.6 (05 Aug 2019 11:12), Max: 37.1 (04 Aug 2019 21:36)  T(F): 97.8 (05 Aug 2019 11:12), Max: 98.7 (04 Aug 2019 21:36)  HR: 61 (05 Aug 2019 11:12) (52 - 63)  BP: 124/78 (05 Aug 2019 11:12) (107/- - 124/78)  BP(mean): --  RR: 17 (05 Aug 2019 11:12) (17 - 18)  SpO2: 98% (05 Aug 2019 11:12) (96% - 99%)    PHYSICAL EXAM:  General: WN/WD NAD, Non-toxic  Neurology: A&Ox3, nonfocal  Respiratory: no resp distress  CV: RRR, S1S2, no murmurs, rubs or gallops  Abdominal:  non-distended  Extremities: No edemas  Line Sites: Clear PICC line in place  Skin: No rash                     13.0   7.16  )-----------( 473      ( 04 Aug 2019 08:26 )             39.3   WBC Count: 7.16 (08-04 @ 08:26)  WBC Count: 9.26 (08-03 @ 10:54)  WBC Count: 7.03 (08-02 @ 11:13)  WBC Count: 10.8 (08-01 @ 21:32)    08-04    142  |  101  |  13  ----------------------------<  114<H>  3.9   |  26  |  0.98    Ca    9.7      04 Aug 2019 05:57  Phos  4.2     08-04  Mg     2.4     08-04    RADIOLOGY:  < from: CT Abdomen and Pelvis w/ Oral Cont and w/ IV Cont (08.01.19 @ 17:43) >  LIVER: A 3.2 x 2.3 cm hypodense lesion in segment 6 measures higher than   simple fluid and demonstrates a slightly thickened wall. Findings raise   concern for abscess. Correlation with prior imaging studies would prove   useful. An additional subcentimeter hypodense focus in segment 7 is too   small to characterize.  BILE DUCTS: Normal caliber.  GALLBLADDER: Within normal limits.  SPLEEN: Within normal limits.  PANCREAS: Within normal limits.  ADRENALS: Within normal limits.  KIDNEYS/URETERS: Within normal limits.    BLADDER: Within normal limits.  REPRODUCTIVE ORGANS: Prostate within normal limits.    BOWEL/PERITONEUM: No bowel obstruction. Appendectomy. Mild inflammatory   changes including a small amount of fluid with peripheral enhancement in   the right paracolic gutter. This fluid contains a punctate calcification   (3:60) concerning for a small dropped appendicolith. Heterogeneous   density material at the capsular surface of the hepatic dome on the right   (3:6) suggestive of phlegmon. No ascites or drainable peritoneal   collection.  VESSELS: Atherosclerotic changes.  RETROPERITONEUM/LYMPH NODES: No lymphadenopathy.    ABDOMINAL WALL: Postsurgicalchanges.  BONES: Within normal limits.    IMPRESSION:     Status post appendectomy with mild inflammatory changes in the right   lower quadrant including trace fluid with peripheral enhancement in the   paracolic gutter which contains a calcificationsuspicious for dropped   stone. Additional phlegmonous changes just below the right diaphragm.    A 3.2 cm lesion in segment 6 of the liver suspicious for hepatic abscess.   Correlation with prior studies would prove useful.    < end of copied text >      Mele Rand MD; Division of Infectious Disease; Pager: [phone]; nights and weekends: [phone]

## 2019-08-05 NOTE — PROGRESS NOTE ADULT - ATTENDING COMMENTS
seen and examined 08-05-19 @ 1000    7/20/2019 - laparoscopic appendectomy for perforated appendicitis    tolerating regular diet  no nausea or vomiting  +flatus / +BM  c/o RUQ pain on deep inspiration  no fevers or chills    soft / NT / ND    small segment 6 liver abscess from perforated appendicitis  -discharge home on ertapenem x 4 weeks

## 2019-08-05 NOTE — DISCHARGE NOTE PROVIDER - NSDCCPCAREPLAN_GEN_ALL_CORE_FT
PRINCIPAL DISCHARGE DIAGNOSIS  Diagnosis: Liver abscess  Assessment and Plan of Treatment: cont ABX as presrbied with repeat imaging in 3 weeks.      SECONDARY DISCHARGE DIAGNOSES  Diagnosis: Right upper quadrant abdominal pain  Assessment and Plan of Treatment:

## 2019-08-05 NOTE — DISCHARGE NOTE NURSING/CASE MANAGEMENT/SOCIAL WORK - NSDCDPATPORTLINK_GEN_ALL_CORE
You can access the [facility] Patient Portal, offered by [facility], by registering with the following website: [email or web address]

## 2019-08-05 NOTE — DISCHARGE NOTE PROVIDER - HOSPITAL COURSE
44 year old s/p laparoscopic appendectomy and wash out for perforated appendicitis in [place] on 7/20/19.  Now returns to ED c/o RUQ pain.  CT with lateral 3.2 x 2.3 cm hepatic abscess & free fluid in right paracolic gutter.      IR consulted stating too small for drainage.  ID consulted and recommeds ertapenem for a total of 28 days.  PICC placed. Once homecare arrangd patient was stable for DC home.

## 2019-08-05 NOTE — DISCHARGE NOTE PROVIDER - NSFOLLOWUPCLINICS_GEN_ALL_ED_FT
[facility] - Infectious Disease  Infectious Disease  [address], [place] [address]  Phone: [phone]  Fax:   Follow Up Time: 7-10 Days

## 2019-08-06 NOTE — DISCUSSION/SUMMARY
[FreeTextEntry1] : SPoke with patient regarding his recent hospitalization from [facility] from 8/1/19-8/5/19.  The patient had been in [place] and had an acute appendicitis.  He was found to have a ruptured appendix and was treated with ABT in [place] where he was hospitalized fro 7/20-7/24. On 7/30 he was seen here in the office for staple removal.  His CBC was abnormal and he was sent for a CT which revealed a liver abscess.  He is home now feeling better.  A PICC line was inserted and the patient is on ABT x 3 weeks.  He is being followed by ID and Surgery.  He has appointments scheduled with both.  He declines a visit here at this time.  He has a CPE scheduled for October and will keep that appointment.  He was advised to call the office for any issues or concerns.  Verbal understanding was confirmed.  Dr Davis was made aware. [Home] : patient was discharged to home [FreeTextEntry3] : The patient declined a [facility] appointment at this time

## 2019-08-07 PROBLEM — Z78.9 OTHER SPECIFIED HEALTH STATUS: Chronic | Status: ACTIVE | Noted: 2019-08-01

## 2019-08-14 ENCOUNTER — APPOINTMENT (OUTPATIENT)
Dept: INFECTIOUS DISEASE | Facility: CLINIC | Age: 44
End: 2019-08-14
Payer: COMMERCIAL

## 2019-08-14 VITALS
HEIGHT: 71 IN | WEIGHT: 174 LBS | RESPIRATION RATE: 16 BRPM | HEART RATE: 64 BPM | OXYGEN SATURATION: 100 % | SYSTOLIC BLOOD PRESSURE: 117 MMHG | BODY MASS INDEX: 24.36 KG/M2 | TEMPERATURE: 97.7 F | DIASTOLIC BLOOD PRESSURE: 70 MMHG

## 2019-08-14 PROCEDURE — 99214 OFFICE O/P EST MOD 30 MIN: CPT

## 2019-08-14 NOTE — HISTORY OF PRESENT ILLNESS
[FreeTextEntry1] : Mr Panchito notes general improvement. He has less RUQ discomfort and no longer has pleuritic pain. On some days he has considerable fatigue at end of day. He has no difficulty with intravenous infusion.\par \par \par \par Hospitalized at [facility] 8/1 --> 8/5/19\par 44 year old male with no past medical history s/p appendectomy and washout on 7/20/19 for perforated appendicitis, admitted 8/1/19 and found to have a hepatic abscess too small to be drained by IR. \par \par < from: CT Abdomen and Pelvis w/ Oral Cont and w/ IV Cont (08.01.19 @ 17:43) >\par LIVER: A 3.2 x 2.3 cm hypodense lesion in segment 6 measures higher than \par simple fluid and demonstrates a slightly thickened wall. Findings raise \par concern for abscess. \par \par s/p PICC line placement\par Ertapenem 1 gm iv daily 8/1--> x 4 weeks through 8/29/19\par \par 8/2/19 TBili/alk phos = 0.5/472; AST/ALT= 26/75\par 8/4/19: WBC= 7.16\par 8/12/10 wbc= 6.09; Creat = 1.03; Alk phos = 219 AST/ALT = 38/95

## 2019-08-14 NOTE — ASSESSMENT
[FreeTextEntry1] : doing well\par s/p appendectomy and washout on 7/20/19 for perforated appendicitis\par complicated by hepatic abscess \par Tolerating antibiotic therapy with Ertapenem 8/1/19 -->\par clinically improved\par labs stable/improved\par \par Abd CT scan ordered - requested to be done on or around 8/21 to confirm that abscess is resolving.\par Mr Flanagan has family commitment early 8/29: if doing well, will complete Ertapenem on 8/28 and remove PICC

## 2019-08-14 NOTE — PHYSICAL EXAM
[General Appearance - Alert] : alert [General Appearance - In No Acute Distress] : in no acute distress [General Appearance - Well-Appearing] : healthy appearing [Sclera] : the sclera and conjunctiva were normal [PERRL With Normal Accommodation] : pupils were equal in size, round, reactive to light [Extraocular Movements] : extraocular movements were intact [Outer Ear] : the ears and nose were normal in appearance [Oropharynx] : the oropharynx was normal with no thrush [Neck Appearance] : the appearance of the neck was normal [Neck Cervical Mass (___cm)] : no neck mass was observed [Jugular Venous Distention Increased] : there was no jugular-venous distention [Thyroid Diffuse Enlargement] : the thyroid was not enlarged [Auscultation Breath Sounds / Voice Sounds] : lungs were clear to auscultation bilaterally [Heart Rate And Rhythm] : heart rate was normal and rhythm regular [Heart Sounds] : normal S1 and S2 [Heart Sounds Gallop] : no gallops [Murmurs] : no murmurs [Heart Sounds Pericardial Friction Rub] : no pericardial rub [Edema] : there was no peripheral edema [Bowel Sounds] : normal bowel sounds [Abdomen Soft] : soft [Abdomen Tenderness] : non-tender [Abdomen Mass (___ Cm)] : no abdominal mass palpated [Costovertebral Angle Tenderness] : no CVA tenderness [No Palpable Adenopathy] : no palpable adenopathy [Musculoskeletal - Swelling] : no joint swelling [Nail Clubbing] : no clubbing  or cyanosis of the fingernails [Motor Tone] : muscle strength and tone were normal [Skin Color & Pigmentation] : normal skin color and pigmentation [] : no rash [No Focal Deficits] : no focal deficits [Oriented To Time, Place, And Person] : oriented to person, place, and time [Affect] : the affect was normal [FreeTextEntry1] : PICC site LUE in medialbiceps area appears benign

## 2019-08-14 NOTE — REVIEW OF SYSTEMS
[Feeling Tired] : feeling tired [As Noted in HPI] : as noted in HPI [Abdominal Pain] : abdominal pain [Negative] : Heme/Lymph [FreeTextEntry7] : improved

## 2019-08-21 ENCOUNTER — CLINICAL ADVICE (OUTPATIENT)
Age: 44
End: 2019-08-21

## 2019-08-26 ENCOUNTER — FORM ENCOUNTER (OUTPATIENT)
Age: 44
End: 2019-08-26

## 2019-08-27 ENCOUNTER — OUTPATIENT (OUTPATIENT)
Dept: OUTPATIENT SERVICES | Facility: HOSPITAL | Age: 44
LOS: 1 days | End: 2019-08-27
Payer: COMMERCIAL

## 2019-08-27 ENCOUNTER — APPOINTMENT (OUTPATIENT)
Dept: CT IMAGING | Facility: CLINIC | Age: 44
End: 2019-08-27
Payer: COMMERCIAL

## 2019-08-27 ENCOUNTER — CLINICAL ADVICE (OUTPATIENT)
Age: 44
End: 2019-08-27

## 2019-08-27 DIAGNOSIS — Z90.49 ACQUIRED ABSENCE OF OTHER SPECIFIED PARTS OF DIGESTIVE TRACT: Chronic | ICD-10-CM

## 2019-08-27 DIAGNOSIS — Z00.8 ENCOUNTER FOR OTHER GENERAL EXAMINATION: ICD-10-CM

## 2019-08-27 PROCEDURE — 74160 CT ABDOMEN W/CONTRAST: CPT

## 2019-08-27 PROCEDURE — 74160 CT ABDOMEN W/CONTRAST: CPT | Mod: 26

## 2019-09-10 ENCOUNTER — MOBILE ON CALL (OUTPATIENT)
Age: 44
End: 2019-09-10

## 2019-09-10 ENCOUNTER — CLINICAL ADVICE (OUTPATIENT)
Age: 44
End: 2019-09-10

## 2019-09-16 ENCOUNTER — APPOINTMENT (OUTPATIENT)
Dept: INFECTIOUS DISEASE | Facility: CLINIC | Age: 44
End: 2019-09-16

## 2019-10-18 ENCOUNTER — APPOINTMENT (OUTPATIENT)
Dept: INTERNAL MEDICINE | Facility: CLINIC | Age: 44
End: 2019-10-18
Payer: COMMERCIAL

## 2019-10-18 VITALS
SYSTOLIC BLOOD PRESSURE: 112 MMHG | WEIGHT: 174 LBS | HEART RATE: 60 BPM | TEMPERATURE: 98.2 F | DIASTOLIC BLOOD PRESSURE: 78 MMHG | HEIGHT: 71 IN | OXYGEN SATURATION: 99 % | BODY MASS INDEX: 24.36 KG/M2

## 2019-10-18 DIAGNOSIS — Z01.818 ENCOUNTER FOR OTHER PREPROCEDURAL EXAMINATION: ICD-10-CM

## 2019-10-18 PROCEDURE — 36415 COLL VENOUS BLD VENIPUNCTURE: CPT

## 2019-10-18 PROCEDURE — 99243 OFF/OP CNSLTJ NEW/EST LOW 30: CPT | Mod: 25

## 2019-10-21 ENCOUNTER — TRANSCRIPTION ENCOUNTER (OUTPATIENT)
Age: 44
End: 2019-10-21

## 2019-10-21 ENCOUNTER — APPOINTMENT (OUTPATIENT)
Dept: INFECTIOUS DISEASE | Facility: CLINIC | Age: 44
End: 2019-10-21
Payer: COMMERCIAL

## 2019-10-21 VITALS
BODY MASS INDEX: 24.22 KG/M2 | TEMPERATURE: 97.5 F | HEIGHT: 71 IN | HEART RATE: 66 BPM | OXYGEN SATURATION: 97 % | WEIGHT: 173 LBS | DIASTOLIC BLOOD PRESSURE: 72 MMHG | SYSTOLIC BLOOD PRESSURE: 122 MMHG

## 2019-10-21 LAB
ALBUMIN SERPL ELPH-MCNC: 4.8 G/DL
ALP BLD-CCNC: 68 U/L
ALT SERPL-CCNC: 31 U/L
ANION GAP SERPL CALC-SCNC: 14 MMOL/L
APPEARANCE: CLEAR
AST SERPL-CCNC: 19 U/L
BACTERIA UR CULT: NORMAL
BACTERIA: NEGATIVE
BASOPHILS # BLD AUTO: 0.04 K/UL
BASOPHILS NFR BLD AUTO: 0.7 %
BILIRUB SERPL-MCNC: 0.9 MG/DL
BILIRUBIN URINE: NEGATIVE
BLOOD URINE: NEGATIVE
BUN SERPL-MCNC: 18 MG/DL
CALCIUM SERPL-MCNC: 9.5 MG/DL
CHLORIDE SERPL-SCNC: 103 MMOL/L
CO2 SERPL-SCNC: 23 MMOL/L
COLOR: COLORLESS
CREAT SERPL-MCNC: 1.24 MG/DL
EOSINOPHIL # BLD AUTO: 0.12 K/UL
EOSINOPHIL NFR BLD AUTO: 2 %
GLUCOSE QUALITATIVE U: NEGATIVE
GLUCOSE SERPL-MCNC: 95 MG/DL
HCT VFR BLD CALC: 42.4 %
HGB BLD-MCNC: 13.9 G/DL
HYALINE CASTS: 1 /LPF
IMM GRANULOCYTES NFR BLD AUTO: 0.2 %
KETONES URINE: NEGATIVE
LEUKOCYTE ESTERASE URINE: NEGATIVE
LYMPHOCYTES # BLD AUTO: 2.54 K/UL
LYMPHOCYTES NFR BLD AUTO: 42.8 %
MAN DIFF?: NORMAL
MCHC RBC-ENTMCNC: 29.6 PG
MCHC RBC-ENTMCNC: 32.8 GM/DL
MCV RBC AUTO: 90.2 FL
MICROSCOPIC-UA: NORMAL
MONOCYTES # BLD AUTO: 0.48 K/UL
MONOCYTES NFR BLD AUTO: 8.1 %
NEUTROPHILS # BLD AUTO: 2.75 K/UL
NEUTROPHILS NFR BLD AUTO: 46.2 %
NITRITE URINE: NEGATIVE
PH URINE: 6.5
PLATELET # BLD AUTO: 261 K/UL
POTASSIUM SERPL-SCNC: 3.9 MMOL/L
PROT SERPL-MCNC: 7 G/DL
PROTEIN URINE: NEGATIVE
RBC # BLD: 4.7 M/UL
RBC # FLD: 13.6 %
RED BLOOD CELLS URINE: 1 /HPF
SODIUM SERPL-SCNC: 140 MMOL/L
SPECIFIC GRAVITY URINE: 1.01
SQUAMOUS EPITHELIAL CELLS: 0 /HPF
UROBILINOGEN URINE: NORMAL
WBC # FLD AUTO: 5.94 K/UL
WHITE BLOOD CELLS URINE: 0 /HPF

## 2019-10-21 PROCEDURE — 99214 OFFICE O/P EST MOD 30 MIN: CPT

## 2019-10-21 NOTE — HISTORY OF PRESENT ILLNESS
[FreeTextEntry1] : Mr Flanagan is clinically well. He is medically cleared for vasectomy scheduled for 10/28/19 from Infectious disease perspective.\par \par Mr Flanagan feels well. This past weekend he ran half marathon in 1:42 setting a personal best time. He has no fever, chills or pain. He previously had a sensation in his right side 1-2x per day, brought on by stretch or depp breathing which as resolved over a week ago.\par \par He saw his PMD on 10/18/19 and was found to be well. His labs from that date are normal including WBC= 5.94. HIs UA was benign and urine culture negative.\par \par Mr Flanagan had  no past medical history  when he required urgent appendectomy and washout on 7/20/19 for perforated appendicitis he was hospitalized at [facility] 8/1 --> 8/5/19 and found to have a hepatic abscess too small to be drained by IR. \par \par < from: CT Abdomen and Pelvis w/ Oral Cont and w/ IV Cont (08.01.19 @ 17:43) >\par LIVER: A 3.2 x 2.3 cm hypodense lesion in segment 6 measures higher than \par simple fluid and demonstrates a slightly thickened wall. Findings raise \par concern for abscess. \par PICC line was placed and Intravenous antibiotic were provided:\par Ertapenem 1 gm iv daily 8/1--> x 6 weeks was completed 9/11/19\par \par Follow up CT scan of abd has documented progressive improvement. He has continued to improve and feel better since completing antibiotics. \par

## 2019-10-21 NOTE — ASSESSMENT
[Medical Care Issues] : medical care issues [FreeTextEntry1] : Mr Flanagan is well and healthy. He has recovered from his perforated appendicitis for which he underwent appendectomy and wash out on 7/20/19 complicated by hepatic abscess which has been cured with 6 weeks of intravenous antibiotics (Ertapenem) on 9/11/19. \par \par He is medically cleared for vasectomy from infectious disease perspective.

## 2019-10-21 NOTE — PHYSICAL EXAM
[General Appearance - Alert] : alert [General Appearance - In No Acute Distress] : in no acute distress [General Appearance - Well-Appearing] : healthy appearing [PERRL With Normal Accommodation] : pupils were equal in size, round, reactive to light [Sclera] : the sclera and conjunctiva were normal [Extraocular Movements] : extraocular movements were intact [Outer Ear] : the ears and nose were normal in appearance [Neck Appearance] : the appearance of the neck was normal [Oropharynx] : the oropharynx was normal with no thrush [Neck Cervical Mass (___cm)] : no neck mass was observed [Thyroid Diffuse Enlargement] : the thyroid was not enlarged [Jugular Venous Distention Increased] : there was no jugular-venous distention [Auscultation Breath Sounds / Voice Sounds] : lungs were clear to auscultation bilaterally [Heart Rate And Rhythm] : heart rate was normal and rhythm regular [Heart Sounds] : normal S1 and S2 [Heart Sounds Gallop] : no gallops [Murmurs] : no murmurs [Heart Sounds Pericardial Friction Rub] : no pericardial rub [Edema] : there was no peripheral edema [Bowel Sounds] : normal bowel sounds [Abdomen Soft] : soft [Abdomen Tenderness] : non-tender [Abdomen Mass (___ Cm)] : no abdominal mass palpated [Costovertebral Angle Tenderness] : no CVA tenderness [No Palpable Adenopathy] : no palpable adenopathy [Nail Clubbing] : no clubbing  or cyanosis of the fingernails [Musculoskeletal - Swelling] : no joint swelling [Motor Tone] : muscle strength and tone were normal [] : no rash [Skin Color & Pigmentation] : normal skin color and pigmentation [No Focal Deficits] : no focal deficits [Oriented To Time, Place, And Person] : oriented to person, place, and time [Affect] : the affect was normal [FreeTextEntry1] : trim

## 2019-10-21 NOTE — CONSULT LETTER
[Dear  ___] : Dear  [unfilled], [Consult Letter:] : I had the pleasure of evaluating your patient, [unfilled]. [Please see my note below.] : Please see my note below. [Sincerely,] : Sincerely, [Consult Closing:] : Thank you very much for allowing me to participate in the care of this patient.  If you have any questions, please do not hesitate to contact me. [FreeTextEntry2] : Dr. Joseph Hebert \par [address]\[place] [address]. \par [phone], ext [phone] (ph); (\erw [phone] (fax), Attn: Cate. [FreeTextEntry3] : Mele Rand MD, FACP, POLO, AAHIVM\par Infectious Diseases\par [facility]

## 2019-10-21 NOTE — PHYSICAL EXAM
[No Acute Distress] : no acute distress [Well Nourished] : well nourished [Well Developed] : well developed [Well-Appearing] : well-appearing [PERRL] : pupils equal round and reactive to light [Normal Voice/Communication] : normal voice/communication [Normal Sclera/Conjunctiva] : normal sclera/conjunctiva [EOMI] : extraocular movements intact [Normal Oropharynx] : the oropharynx was normal [Normal Outer Ear/Nose] : the outer ears and nose were normal in appearance [Normal TMs] : both tympanic membranes were normal [No JVD] : no jugular venous distention [Supple] : supple [No Lymphadenopathy] : no lymphadenopathy [Thyroid Normal, No Nodules] : the thyroid was normal and there were no nodules present [No Respiratory Distress] : no respiratory distress  [No Accessory Muscle Use] : no accessory muscle use [Clear to Auscultation] : lungs were clear to auscultation bilaterally [Normal Rate] : normal rate  [Regular Rhythm] : with a regular rhythm [No Murmur] : no murmur heard [Normal S1, S2] : normal S1 and S2 [No Carotid Bruits] : no carotid bruits [No Abdominal Bruit] : a ~M bruit was not heard ~T in the abdomen [No Varicosities] : no varicosities [Pedal Pulses Present] : the pedal pulses are present [No Palpable Aorta] : no palpable aorta [No Edema] : there was no peripheral edema [Soft] : abdomen soft [No Extremity Clubbing/Cyanosis] : no extremity clubbing/cyanosis [Non Tender] : non-tender [Non-distended] : non-distended [No Masses] : no abdominal mass palpated [No HSM] : no HSM [No Hernias] : no hernias [Normal Bowel Sounds] : normal bowel sounds [Normal Posterior Cervical Nodes] : no posterior cervical lymphadenopathy [Normal Anterior Cervical Nodes] : no anterior cervical lymphadenopathy [No CVA Tenderness] : no CVA  tenderness [No Spinal Tenderness] : no spinal tenderness [No Joint Swelling] : no joint swelling [Grossly Normal Strength/Tone] : grossly normal strength/tone [No Rash] : no rash [Coordination Grossly Intact] : coordination grossly intact [No Focal Deficits] : no focal deficits [Normal Gait] : normal gait [Deep Tendon Reflexes (DTR)] : deep tendon reflexes were 2+ and symmetric [Normal Insight/Judgement] : insight and judgment were intact [Normal Affect] : the affect was normal [de-identified] : No redness or swelling at the former PICC line site.

## 2019-10-21 NOTE — PLAN
[FreeTextEntry1] : Patient reminded to avoid NSAIDs for 7 days prior to surgery.  He can use Tylenol as needed for pain.  Patient was also reminded to call for any acute illness that may occur preoperatively, including fevers, chills, cough, phlegm production, sore throat, nausea, vomiting, abdominal pain, diarrhea, rectal bleeding, dysuria, hematuria, frequent urination, rash, body aches, or other concerning symptoms.\par \par Patient will be getting separate ID clearance from Dr. Nish Rand.

## 2019-10-21 NOTE — HISTORY OF PRESENT ILLNESS
[No Pertinent Cardiac History] : no history of aortic stenosis, atrial fibrillation, coronary artery disease, recent myocardial infarction, or implantable device/pacemaker [No Pertinent Pulmonary History] : no history of asthma, COPD, sleep apnea, or smoking [No Adverse Anesthesia Reaction] : no adverse anesthesia reaction in self or family member [(Patient denies any chest pain, claudication, dyspnea on exertion, orthopnea, palpitations or syncope)] : Patient denies any chest pain, claudication, dyspnea on exertion, orthopnea, palpitations or syncope [Excellent (>10 METs)] : Excellent (>10 METs) [Family Member] : no family member with adverse anesthesia reaction/sudden death [Chronic Anticoagulation] : no chronic anticoagulation [Diabetes] : no diabetes [FreeTextEntry1] : Vasectomy [Chronic Kidney Disease] : no chronic kidney disease [FreeTextEntry3] : Dr. Joseph Hebert [FreeTextEntry4] : Office based procedure, [address], [place]  [address].  [phone], ext [phone] (ph); [phone] (fax), Attn: Cate.\par \par Patient has recovered from a recent bout of appendicitis and hepatic abscess.  He was admitted to a hospital in [place] ([facility]) 7/20/19 to 7/24/19) and had a EVELINA drain placed for drainage and was given IV antibiotics.  He was discharged home on Augmentin.  He was seen by Dr. Godoy in our office 7/31/19, and surgical staples and an abdominal wall suture were removed.  Labs were repeated, and WBC was 14, with elevated liver enzymes.  He was then sent for a CT scan, which showed a 3cm liver abscess.  Patient was sent to the ER, where the abscess was felt to be to small for IV drainage.  He was seen by ID, and given ertapenem.  A PICC line was placed, and the IV antibiotics were to continue for 4 weeks.  He was followed by Dr. Mele Rand (ID).  His WBC improved, and patient was feeling well.  A repeat CT scan 8/27/19 showed that the abscess had largely resolved.  Patient's PICC line was removed.  \par \par Patient has otherwise been feeling well in recent days.  He has no symptoms of a URI or UTI.  He has had no redness or swelling at the former site of the PICC line.\par \par In March and April 2019, patient had 2 prednisone courses (2 weeks each). due to a "pinched nerve" in the neck. [FreeTextEntry2] : 10/28/19 [FreeTextEntry8] : Running a 1/2 marathon on 10/19/19. [FreeTextEntry7] : No prior cardiac testing.

## 2019-12-17 ENCOUNTER — NON-APPOINTMENT (OUTPATIENT)
Age: 44
End: 2019-12-17

## 2019-12-17 ENCOUNTER — APPOINTMENT (OUTPATIENT)
Dept: INTERNAL MEDICINE | Facility: CLINIC | Age: 44
End: 2019-12-17
Payer: COMMERCIAL

## 2019-12-17 VITALS
DIASTOLIC BLOOD PRESSURE: 60 MMHG | TEMPERATURE: 97.7 F | WEIGHT: 177 LBS | HEART RATE: 56 BPM | HEIGHT: 72 IN | SYSTOLIC BLOOD PRESSURE: 118 MMHG | OXYGEN SATURATION: 98 % | RESPIRATION RATE: 16 BRPM | BODY MASS INDEX: 23.98 KG/M2

## 2019-12-17 DIAGNOSIS — K75.0 ABSCESS OF LIVER: ICD-10-CM

## 2019-12-17 DIAGNOSIS — Z83.2 FAMILY HISTORY OF DISEASES OF THE BLOOD AND BLOOD-FORMING ORGANS AND CERTAIN DISORDERS INVOLVING THE IMMUNE MECHANISM: ICD-10-CM

## 2019-12-17 DIAGNOSIS — K35.32 ACUTE APPENDICITIS W/ PERFORATION AND LOCALIZED PERITONITIS, W/O ABSCESS: ICD-10-CM

## 2019-12-17 DIAGNOSIS — Z86.2 PERSONAL HISTORY OF DISEASES OF THE BLOOD AND BLOOD-FORMING ORGANS AND CERTAIN DISORDERS INVOLVING THE IMMUNE MECHANISM: ICD-10-CM

## 2019-12-17 DIAGNOSIS — E66.3 OVERWEIGHT: ICD-10-CM

## 2019-12-17 DIAGNOSIS — R74.0 NONSPECIFIC ELEVATION OF LEVELS OF TRANSAMINASE AND LACTIC ACID DEHYDROGENASE [LDH]: ICD-10-CM

## 2019-12-17 DIAGNOSIS — R19.5 OTHER FECAL ABNORMALITIES: ICD-10-CM

## 2019-12-17 DIAGNOSIS — Z92.89 PERSONAL HISTORY OF OTHER MEDICAL TREATMENT: ICD-10-CM

## 2019-12-17 PROCEDURE — 36415 COLL VENOUS BLD VENIPUNCTURE: CPT

## 2019-12-17 PROCEDURE — 99396 PREV VISIT EST AGE 40-64: CPT | Mod: 25

## 2019-12-17 PROCEDURE — 93000 ELECTROCARDIOGRAM COMPLETE: CPT

## 2019-12-17 PROCEDURE — 82270 OCCULT BLOOD FECES: CPT

## 2019-12-17 PROCEDURE — G0444 DEPRESSION SCREEN ANNUAL: CPT

## 2019-12-18 LAB
25(OH)D3 SERPL-MCNC: 46 NG/ML
CHOLEST SERPL-MCNC: 206 MG/DL
CHOLEST/HDLC SERPL: 2.5 RATIO
ESTIMATED AVERAGE GLUCOSE: 108 MG/DL
HBA1C MFR BLD HPLC: 5.4 %
HDLC SERPL-MCNC: 81 MG/DL
LDLC SERPL CALC-MCNC: 99 MG/DL
T4 FREE SERPL-MCNC: 1.4 NG/DL
TRIGL SERPL-MCNC: 131 MG/DL
TSH SERPL-ACNC: 1.88 UIU/ML

## 2019-12-18 NOTE — PHYSICAL EXAM
[No Acute Distress] : no acute distress [Well Developed] : well developed [Well Nourished] : well nourished [Normal Voice/Communication] : normal voice/communication [Well-Appearing] : well-appearing [EOMI] : extraocular movements intact [Normal Sclera/Conjunctiva] : normal sclera/conjunctiva [PERRL] : pupils equal round and reactive to light [Normal Oropharynx] : the oropharynx was normal [Normal Outer Ear/Nose] : the outer ears and nose were normal in appearance [Normal TMs] : both tympanic membranes were normal [No JVD] : no jugular venous distention [No Lymphadenopathy] : no lymphadenopathy [Thyroid Normal, No Nodules] : the thyroid was normal and there were no nodules present [Supple] : supple [No Respiratory Distress] : no respiratory distress  [Clear to Auscultation] : lungs were clear to auscultation bilaterally [No Accessory Muscle Use] : no accessory muscle use [Normal Rate] : normal rate  [Regular Rhythm] : with a regular rhythm [Normal S1, S2] : normal S1 and S2 [No Murmur] : no murmur heard [No Carotid Bruits] : no carotid bruits [No Varicosities] : no varicosities [No Abdominal Bruit] : a ~M bruit was not heard ~T in the abdomen [No Edema] : there was no peripheral edema [No Palpable Aorta] : no palpable aorta [Pedal Pulses Present] : the pedal pulses are present [Non Tender] : non-tender [No Extremity Clubbing/Cyanosis] : no extremity clubbing/cyanosis [Soft] : abdomen soft [No HSM] : no HSM [Non-distended] : non-distended [No Masses] : no abdominal mass palpated [No Hernias] : no hernias [Normal Bowel Sounds] : normal bowel sounds [No Mass] : no mass [Normal Sphincter Tone] : normal sphincter tone [Normal Posterior Cervical Nodes] : no posterior cervical lymphadenopathy [Normal Anterior Cervical Nodes] : no anterior cervical lymphadenopathy [No Spinal Tenderness] : no spinal tenderness [No CVA Tenderness] : no CVA  tenderness [No Joint Swelling] : no joint swelling [Grossly Normal Strength/Tone] : grossly normal strength/tone [No Rash] : no rash [No Focal Deficits] : no focal deficits [Coordination Grossly Intact] : coordination grossly intact [Normal Gait] : normal gait [Deep Tendon Reflexes (DTR)] : deep tendon reflexes were 2+ and symmetric [Normal Insight/Judgement] : insight and judgment were intact [Normal Affect] : the affect was normal [Stool Occult Blood] : stool negative for occult blood

## 2019-12-18 NOTE — ASSESSMENT
[FreeTextEntry1] : (1) HCM - discussed diet, exercise, weight maintenance.  Labs ordered as below (CBC, CMP, U/A omitted as they were done with a recent medical clearance).  Tdap was given 8/16/16, and patient declines the influenza vaccination.  Screening colonoscopy 1/10/18 with Dr. Gallo Horan showed 1 adenoma, with 5 year follow-up recommended.  Patient given Health Care Proxy information today.\par \par (2) s/p appendicitis and small hepatic abscess - patient completed a course of IV antibiotics and has seen ID (Dr. Rand) in follow-up.\par \par (3) Heme - patient requests a Factor V Leiden test due to his family history.  Patient advised that there may

## 2019-12-18 NOTE — HEALTH RISK ASSESSMENT
[Very Good] : ~his/her~  mood as very good [Yes] : Yes [4 or more  times a week (4 pts)] : 4 or more  times a week (4 points) [1 or 2 (0 pts)] : 1 or 2 (0 points) [Never (0 pts)] : Never (0 points) [No] : In the past 12 months have you used drugs other than those required for medical reasons? No [No falls in past year] : Patient reported no falls in the past year [0] : 2) Feeling down, depressed, or hopeless: Not at all (0) [Patient reported colonoscopy was abnormal] : Patient reported colonoscopy was abnormal [HIV test declined] : HIV test declined [None] : None [With Family] : lives with family [] :  [Employed] : employed [Sexually Active] : sexually active [Feels Safe at Home] : Feels safe at home [Fully functional (using the telephone, shopping, preparing meals, housekeeping, doing laundry, using] : Fully functional and needs no help or supervision to perform IADLs (using the telephone, shopping, preparing meals, housekeeping, doing laundry, using transportation, managing medications and managing finances) [Fully functional (bathing, dressing, toileting, transferring, walking, feeding)] : Fully functional (bathing, dressing, toileting, transferring, walking, feeding) [Smoke Detector] : smoke detector [Seat Belt] :  uses seat belt [Carbon Monoxide Detector] : carbon monoxide detector [Sunscreen] : uses sunscreen [With Patient/Caregiver] : With Patient/Caregiver [de-identified] : Patient is a runner, 3x/week, 8-12 miles, 2-3x/week gym (90 min). [[id number]] : 0 [Change in mental status noted] : No change in mental status noted [Language] : denies difficulty with language [Behavior] : denies difficulty with behavior [Handling Complex Tasks] : denies difficulty handling complex tasks [Reasoning] : denies difficulty with reasoning [High Risk Behavior] : no high risk behavior [Reports changes in hearing] : Reports no changes in hearing [Reports changes in dental health] : Reports no changes in dental health [ColonoscopyDate] : 1/10/18 [ColonoscopyComments] : Dr. Gallo Horan, 1 adenoma, 5 year follow-up recommended. 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  Astigmatism - has distance glasses for night driving. [de-identified] : Going regularly. [AdvancecareDate] : 12/17/2019 [FreeTextEntry4] : Patient given Health Care Proxy information today.

## 2019-12-18 NOTE — HISTORY OF PRESENT ILLNESS
[de-identified] : Patient presents for a follow-up annual physical.\par \par Patient's father has Factor V Leiden deficiency, and would like to be tested.

## 2019-12-18 NOTE — ADDENDUM
[FreeTextEntry1] : 12/18/19 - Labs WNL.  Factor V Leiden not done - order left for patient to do this on return to office or local lab.  Left message for patient at 8AM.

## 2019-12-30 ENCOUNTER — MOBILE ON CALL (OUTPATIENT)
Age: 44
End: 2019-12-30

## 2019-12-30 ENCOUNTER — RESULT REVIEW (OUTPATIENT)
Age: 44
End: 2019-12-30

## 2020-01-06 LAB — DNA PLOIDY SPEC FC-IMP: NORMAL

## 2020-05-29 ENCOUNTER — APPOINTMENT (OUTPATIENT)
Dept: INTERNAL MEDICINE | Facility: CLINIC | Age: 45
End: 2020-05-29
Payer: COMMERCIAL

## 2020-05-29 PROCEDURE — 36415 COLL VENOUS BLD VENIPUNCTURE: CPT

## 2020-06-01 LAB
SARS-COV-2 IGG SERPL IA-ACNC: <0.1 INDEX
SARS-COV-2 IGG SERPL QL IA: NEGATIVE

## 2020-09-29 NOTE — ED ADULT NURSE NOTE - LOCATION
abdomen Information: Selecting Yes will display possible errors in your note based on the variables you have selected. This validation is only offered as a suggestion for you. PLEASE NOTE THAT THE VALIDATION TEXT WILL BE REMOVED WHEN YOU FINALIZE YOUR NOTE. IF YOU WANT TO FAX A PRELIMINARY NOTE YOU WILL NEED TO TOGGLE THIS TO 'NO' IF YOU DO NOT WANT IT IN YOUR FAXED NOTE.

## 2020-11-17 ENCOUNTER — APPOINTMENT (OUTPATIENT)
Dept: INTERNAL MEDICINE | Facility: CLINIC | Age: 45
End: 2020-11-17
Payer: COMMERCIAL

## 2020-11-17 VITALS
HEIGHT: 71 IN | BODY MASS INDEX: 26.46 KG/M2 | SYSTOLIC BLOOD PRESSURE: 116 MMHG | TEMPERATURE: 97.34 F | DIASTOLIC BLOOD PRESSURE: 72 MMHG | HEART RATE: 56 BPM | OXYGEN SATURATION: 98 % | WEIGHT: 189 LBS

## 2020-11-17 DIAGNOSIS — R10.9 UNSPECIFIED ABDOMINAL PAIN: ICD-10-CM

## 2020-11-17 PROCEDURE — 99213 OFFICE O/P EST LOW 20 MIN: CPT | Mod: 25

## 2020-11-17 NOTE — PHYSICAL EXAM
[No Acute Distress] : no acute distress [Well Nourished] : well nourished [Well Developed] : well developed [Normal Sclera/Conjunctiva] : normal sclera/conjunctiva [EOMI] : extraocular movements intact [Normal Outer Ear/Nose] : the outer ears and nose were normal in appearance [No Respiratory Distress] : no respiratory distress  [Clear to Auscultation] : lungs were clear to auscultation bilaterally [Normal Rate] : normal rate  [Regular Rhythm] : with a regular rhythm [Normal S1, S2] : normal S1 and S2 [No Murmur] : no murmur heard [Soft] : abdomen soft [Non Tender] : non-tender [Non-distended] : non-distended [Normal Bowel Sounds] : normal bowel sounds [No CVA Tenderness] : no CVA  tenderness [No Spinal Tenderness] : no spinal tenderness [Coordination Grossly Intact] : coordination grossly intact [Normal Gait] : normal gait [Normal Insight/Judgement] : insight and judgment were intact

## 2020-11-17 NOTE — HISTORY OF PRESENT ILLNESS
[FreeTextEntry1] : Follow up  [de-identified] : Mr. ALIYAH BONILLA is a 45 year old man with pmhx of hx of appendectomy, live abscess who presents for a follow up. He was admitted [facility] from Oct 22 -23. On oct 22 has epigastric pain, squeezing, intermittent. Pain worsened thought the day, went away in the evening and recurred during the night. Visit ED, had crampy that caused him to vomit his dinner. He had transaminitis, which were attributed to running 12 miles. CT abd , HIDA scan were inconclusive. He was discharged home with follow up. He has been doing well. No recurrence in abdominal pain. He has been running. 8 miles today. No dyspnea, chest pain or shortness of breath.  \par

## 2021-09-01 ENCOUNTER — APPOINTMENT (OUTPATIENT)
Dept: MRI IMAGING | Facility: CLINIC | Age: 46
End: 2021-09-01
Payer: COMMERCIAL

## 2021-09-01 ENCOUNTER — OUTPATIENT (OUTPATIENT)
Dept: OUTPATIENT SERVICES | Facility: HOSPITAL | Age: 46
LOS: 1 days | End: 2021-09-01
Payer: COMMERCIAL

## 2021-09-01 DIAGNOSIS — Z00.8 ENCOUNTER FOR OTHER GENERAL EXAMINATION: ICD-10-CM

## 2021-09-01 DIAGNOSIS — Z90.49 ACQUIRED ABSENCE OF OTHER SPECIFIED PARTS OF DIGESTIVE TRACT: Chronic | ICD-10-CM

## 2021-09-01 PROCEDURE — 72148 MRI LUMBAR SPINE W/O DYE: CPT | Mod: 26

## 2021-09-01 PROCEDURE — 72148 MRI LUMBAR SPINE W/O DYE: CPT

## 2021-09-08 ENCOUNTER — TRANSCRIPTION ENCOUNTER (OUTPATIENT)
Age: 46
End: 2021-09-08

## 2021-10-10 ENCOUNTER — NON-APPOINTMENT (OUTPATIENT)
Age: 46
End: 2021-10-10

## 2021-11-15 ENCOUNTER — APPOINTMENT (OUTPATIENT)
Dept: INTERNAL MEDICINE | Facility: CLINIC | Age: 46
End: 2021-11-15
Payer: COMMERCIAL

## 2021-11-15 VITALS
TEMPERATURE: 97.52 F | WEIGHT: 197 LBS | HEART RATE: 83 BPM | OXYGEN SATURATION: 98 % | HEIGHT: 71 IN | SYSTOLIC BLOOD PRESSURE: 119 MMHG | BODY MASS INDEX: 27.58 KG/M2 | DIASTOLIC BLOOD PRESSURE: 80 MMHG

## 2021-11-15 DIAGNOSIS — R19.7 DIARRHEA, UNSPECIFIED: ICD-10-CM

## 2021-11-15 DIAGNOSIS — D68.51 ACTIVATED PROTEIN C RESISTANCE: ICD-10-CM

## 2021-11-15 DIAGNOSIS — Z80.42 FAMILY HISTORY OF MALIGNANT NEOPLASM OF PROSTATE: ICD-10-CM

## 2021-11-15 DIAGNOSIS — U07.1 COVID-19: ICD-10-CM

## 2021-11-15 PROCEDURE — 99396 PREV VISIT EST AGE 40-64: CPT | Mod: 25

## 2021-11-15 PROCEDURE — G0444 DEPRESSION SCREEN ANNUAL: CPT | Mod: 59

## 2021-11-15 RX ORDER — OXYCODONE AND ACETAMINOPHEN 5; 325 MG/1; MG/1
5-325 TABLET ORAL
Qty: 28 | Refills: 0 | Status: COMPLETED | COMMUNITY
Start: 2021-08-27

## 2021-11-15 RX ORDER — CYCLOBENZAPRINE HYDROCHLORIDE 5 MG/1
5 TABLET, FILM COATED ORAL
Qty: 60 | Refills: 0 | Status: COMPLETED | COMMUNITY
Start: 2021-08-24

## 2021-11-15 RX ORDER — PREDNISONE 10 MG/1
10 TABLET ORAL
Qty: 21 | Refills: 0 | Status: COMPLETED | COMMUNITY
Start: 2021-08-24

## 2021-11-15 NOTE — HISTORY OF PRESENT ILLNESS
[de-identified] : Patient presents for a follow-up annual physical.\pattie \pattie Patient was seen in the [place] ER October 2020 with abdominal pains.  He had elevated transaminases.  A CT Abd/pelvis and HIDA scan were negative.  Patient did not have further episodes.  He has had chronic diarrhea.  Sometimes he has to go several times in a day.  The stools are often normal in the mornings, and they get progressively more liquid.  It has been like this for over a year.  He saw blood once or twice.  There is some pain in the rectal region.  He is able to eat and drink and hold down food/liquid.  No nausea or vomiting.  He hasn't changed his diet.\par \pattie Patient saw Dr. Espana in August 2021 ([facility]).  He got cyclobenzaprine, Percocet, prednisone.  He was found to have several herniated discs.\pattie barillas Gained a lot of weight as his exercise routine stopped due to the pandemic.  He just re-joined a gym October 2021.\par \pattie Patient's spouse got COVID-19 in September 2021, and then the patient got COVID-19 himself late September 2021.  Patient had mild symptoms of chills, with no fever.  No loss of taste or smell.  He had a lot of back aches, and felt tired.  He felt that breathing was somewhat of a challenge.  His O2 sat was normal.  He improved after about 10 days.  He declines the COVID-19 vaccine because his spouse is strongly against it.

## 2021-11-15 NOTE — HEALTH RISK ASSESSMENT
[Very Good] : ~his/her~  mood as very good [Yes] : Yes [4 or more  times a week (4 pts)] : 4 or more  times a week (4 points) [1 or 2 (0 pts)] : 1 or 2 (0 points) [Never (0 pts)] : Never (0 points) [No] : In the past 12 months have you used drugs other than those required for medical reasons? No [No falls in past year] : Patient reported no falls in the past year [0] : 2) Feeling down, depressed, or hopeless: Not at all (0) [Patient reported colonoscopy was abnormal] : Patient reported colonoscopy was abnormal [HIV test declined] : HIV test declined [None] : None [With Family] : lives with family [Employed] : employed [] :  [Sexually Active] : sexually active [Feels Safe at Home] : Feels safe at home [Fully functional (bathing, dressing, toileting, transferring, walking, feeding)] : Fully functional (bathing, dressing, toileting, transferring, walking, feeding) [Fully functional (using the telephone, shopping, preparing meals, housekeeping, doing laundry, using] : Fully functional and needs no help or supervision to perform IADLs (using the telephone, shopping, preparing meals, housekeeping, doing laundry, using transportation, managing medications and managing finances) [Smoke Detector] : smoke detector [Carbon Monoxide Detector] : carbon monoxide detector [Seat Belt] :  uses seat belt [Sunscreen] : uses sunscreen [5-9] : 5-9 [PHQ-2 Negative - No further assessment needed] : PHQ-2 Negative - No further assessment needed [Patient/Caregiver unclear of wishes] : , patient/caregiver unclear of wishes [] : No [de-identified] : Former smoker - 1/2 ppd ages [age] [YearQuit] : 2012 [de-identified] : Patient is a runner, 3x/week, 8-12 miles, 2-3x/week gym (90 min). [VPE2Kulzx] : 0 [Change in mental status noted] : No change in mental status noted [Language] : denies difficulty with language [Behavior] : denies difficulty with behavior [Handling Complex Tasks] : denies difficulty handling complex tasks [Reasoning] : denies difficulty with reasoning [High Risk Behavior] : no high risk behavior [Reports changes in hearing] : Reports no changes in hearing [Reports changes in dental health] : Reports no changes in dental health [ColonoscopyDate] : 01/18 [ColonoscopyComments] : 1/10/2018 - Dr. Gallo Horan, 1 adenoma, 5 year follow-up recommended. 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 [de-identified] : Prescription Reading glasses - started in 2019.  Astigmatism - has distance glasses for night driving. [de-identified] : Going regularly. [AdvancecareDate] : 11/15/2021 [FreeTextEntry4] : Written materials for preparing a Health Care Proxy were previously given to patient, and I encouraged the patient to complete a Health Care Proxy.

## 2021-11-15 NOTE — PHYSICAL EXAM
[No Acute Distress] : no acute distress [Well Nourished] : well nourished [Well Developed] : well developed [Well-Appearing] : well-appearing [Normal Voice/Communication] : normal voice/communication [Normal Sclera/Conjunctiva] : normal sclera/conjunctiva [PERRL] : pupils equal round and reactive to light [EOMI] : extraocular movements intact [Normal Outer Ear/Nose] : the outer ears and nose were normal in appearance [Normal Oropharynx] : the oropharynx was normal [Normal TMs] : both tympanic membranes were normal [No JVD] : no jugular venous distention [No Lymphadenopathy] : no lymphadenopathy [Supple] : supple [Thyroid Normal, No Nodules] : the thyroid was normal and there were no nodules present [No Respiratory Distress] : no respiratory distress  [No Accessory Muscle Use] : no accessory muscle use [Clear to Auscultation] : lungs were clear to auscultation bilaterally [Normal Rate] : normal rate  [Regular Rhythm] : with a regular rhythm [Normal S1, S2] : normal S1 and S2 [No Murmur] : no murmur heard [No Carotid Bruits] : no carotid bruits [No Abdominal Bruit] : a ~M bruit was not heard ~T in the abdomen [No Varicosities] : no varicosities [Pedal Pulses Present] : the pedal pulses are present [No Edema] : there was no peripheral edema [No Palpable Aorta] : no palpable aorta [No Extremity Clubbing/Cyanosis] : no extremity clubbing/cyanosis [Soft] : abdomen soft [Non Tender] : non-tender [Non-distended] : non-distended [No Masses] : no abdominal mass palpated [No HSM] : no HSM [Normal Bowel Sounds] : normal bowel sounds [No Hernias] : no hernias [Normal Posterior Cervical Nodes] : no posterior cervical lymphadenopathy [Normal Anterior Cervical Nodes] : no anterior cervical lymphadenopathy [No CVA Tenderness] : no CVA  tenderness [No Spinal Tenderness] : no spinal tenderness [No Joint Swelling] : no joint swelling [Grossly Normal Strength/Tone] : grossly normal strength/tone [No Rash] : no rash [Coordination Grossly Intact] : coordination grossly intact [No Focal Deficits] : no focal deficits [Normal Gait] : normal gait [Deep Tendon Reflexes (DTR)] : deep tendon reflexes were 2+ and symmetric [Normal Affect] : the affect was normal [Normal Insight/Judgement] : insight and judgment were intact [Normal Sphincter Tone] : normal sphincter tone [No Mass] : no mass [Stool Occult Blood] : stool negative for occult blood [Urethral Meatus] : meatus normal [Penis Abnormality] : normal circumcised penis [Scrotum] : the scrotum was normal [Testes Tenderness] : no tenderness of the testes [Prostate Enlargement] : the prostate was not enlarged [Prostate Tenderness] : the prostate was not tender [FreeTextEntry1] : +hemorrhoid tag.  tenderness to rectal exam

## 2021-11-15 NOTE — ASSESSMENT
[FreeTextEntry1] : (1) HCM - discussed diet, exercise, weight maintenance.  Labs ordered in office as below.  Stool guaiac performed by MD and was negative.  Patient declines the COVID-19 vaccination and influenza vaccination.  Tdap was given 8/16/16.  Screening colonoscopy 1/10/18 with Dr. Gallo Horan showed 1 adenoma, with 5 year follow-up recommended (patient was re-referred to GI due to the ongoing diarrhea).  Patient given Health Care Proxy information today.\par \par (2)  GI - patient is s/p appendicitis and small hepatic abscess in 2019, and in 2020 he had an ER visit for abdominal pains with elevated LFTs (negative CT and HIDA).  He has been having diarrhea since then.  Will send stool samples, and patient is re-referred to GI.\par \par (3) Heme - factor V heterozygote.

## 2022-01-25 DIAGNOSIS — Z29.8 ENCOUNTER FOR OTHER SPECIFIED PROPHYLACTIC MEASURES: ICD-10-CM

## 2022-01-25 LAB
25(OH)D3 SERPL-MCNC: 36.5 NG/ML
ALBUMIN SERPL ELPH-MCNC: 5 G/DL
ALP BLD-CCNC: 67 U/L
ALT SERPL-CCNC: 56 U/L
ANION GAP SERPL CALC-SCNC: 18 MMOL/L
APPEARANCE: CLEAR
AST SERPL-CCNC: 24 U/L
BACTERIA: NEGATIVE
BASOPHILS # BLD AUTO: 0.05 K/UL
BASOPHILS NFR BLD AUTO: 0.6 %
BILIRUB SERPL-MCNC: 1.6 MG/DL
BILIRUBIN URINE: NEGATIVE
BLOOD URINE: NEGATIVE
BUN SERPL-MCNC: 12 MG/DL
C DIFF TOX GENS STL QL NAA+PROBE: NORMAL
CALCIUM SERPL-MCNC: 10.2 MG/DL
CDIFF BY PCR: NOT DETECTED
CHLORIDE SERPL-SCNC: 97 MMOL/L
CHOLEST SERPL-MCNC: 251 MG/DL
CO2 SERPL-SCNC: 23 MMOL/L
COLOR: NORMAL
COVID-19 NUCLEOCAPSID  GAM ANTIBODY INTERPRETATION: POSITIVE
COVID-19 SPIKE DOMAIN ANTIBODY INTERPRETATION: POSITIVE
CREAT SERPL-MCNC: 1.12 MG/DL
EOSINOPHIL # BLD AUTO: 0.07 K/UL
EOSINOPHIL NFR BLD AUTO: 0.8 %
ESTIMATED AVERAGE GLUCOSE: 103 MG/DL
GI PCR PANEL, STOOL: NORMAL
GLUCOSE QUALITATIVE U: NEGATIVE
GLUCOSE SERPL-MCNC: 98 MG/DL
HBA1C MFR BLD HPLC: 5.2 %
HCT VFR BLD CALC: 47.3 %
HDLC SERPL-MCNC: 71 MG/DL
HGB BLD-MCNC: 15.3 G/DL
HYALINE CASTS: 1 /LPF
IMM GRANULOCYTES NFR BLD AUTO: 0.3 %
KETONES URINE: NORMAL
LDLC SERPL CALC-MCNC: 145 MG/DL
LEUKOCYTE ESTERASE URINE: NEGATIVE
LYMPHOCYTES # BLD AUTO: 2.12 K/UL
LYMPHOCYTES NFR BLD AUTO: 23.8 %
MAN DIFF?: NORMAL
MCHC RBC-ENTMCNC: 30.5 PG
MCHC RBC-ENTMCNC: 32.3 GM/DL
MCV RBC AUTO: 94.2 FL
MICROSCOPIC-UA: NORMAL
MONOCYTES # BLD AUTO: 0.54 K/UL
MONOCYTES NFR BLD AUTO: 6.1 %
NEUTROPHILS # BLD AUTO: 6.08 K/UL
NEUTROPHILS NFR BLD AUTO: 68.4 %
NITRITE URINE: NEGATIVE
NONHDLC SERPL-MCNC: 179 MG/DL
PH URINE: 6
PLATELET # BLD AUTO: 287 K/UL
POTASSIUM SERPL-SCNC: 4.2 MMOL/L
PROT SERPL-MCNC: 7.4 G/DL
PROTEIN URINE: NORMAL
RBC # BLD: 5.02 M/UL
RBC # FLD: 13.5 %
RED BLOOD CELLS URINE: 1 /HPF
SARS-COV-2 AB SERPL IA-ACNC: 156 U/ML
SARS-COV-2 AB SERPL QL IA: 99.2 INDEX
SODIUM SERPL-SCNC: 137 MMOL/L
SPECIFIC GRAVITY URINE: 1.02
SQUAMOUS EPITHELIAL CELLS: 0 /HPF
T4 FREE SERPL-MCNC: 1.5 NG/DL
TRIGL SERPL-MCNC: 174 MG/DL
TSH SERPL-ACNC: 1.61 UIU/ML
UROBILINOGEN URINE: NORMAL
WBC # FLD AUTO: 8.89 K/UL
WHITE BLOOD CELLS URINE: 1 /HPF

## 2022-01-25 RX ORDER — ACETAZOLAMIDE 125 MG/1
125 TABLET ORAL
Qty: 10 | Refills: 1 | Status: ACTIVE | COMMUNITY
Start: 2022-01-25 | End: 1900-01-01

## 2022-02-03 ENCOUNTER — APPOINTMENT (OUTPATIENT)
Dept: GASTROENTEROLOGY | Facility: CLINIC | Age: 47
End: 2022-02-03

## 2022-04-15 DIAGNOSIS — H57.9 UNSPECIFIED DISORDER OF EYE AND ADNEXA: ICD-10-CM

## 2022-09-21 ENCOUNTER — NON-APPOINTMENT (OUTPATIENT)
Age: 47
End: 2022-09-21

## 2022-09-29 ENCOUNTER — APPOINTMENT (OUTPATIENT)
Dept: ORTHOPEDIC SURGERY | Facility: CLINIC | Age: 47
End: 2022-09-29

## 2022-09-29 VITALS — HEIGHT: 71 IN | BODY MASS INDEX: 28 KG/M2 | WEIGHT: 200 LBS

## 2022-09-29 DIAGNOSIS — S63.634A SPRAIN OF INTERPHALANGEAL JOINT OF RIGHT RING FINGER, INITIAL ENCOUNTER: ICD-10-CM

## 2022-09-29 PROCEDURE — 99203 OFFICE O/P NEW LOW 30 MIN: CPT

## 2022-09-29 PROCEDURE — 73140 X-RAY EXAM OF FINGER(S): CPT

## 2022-09-29 NOTE — DISCUSSION/SUMMARY
[FreeTextEntry1] : The underlying pathophysiology was reviewed with the patient. XR films were reviewed with the patient. Discussed at length the nature of the patient’s condition. \par \par At this time, I recommended flexion and extension exercises of the digits. He was instructed to utilize the hand for all ADLs as tolerated.\par Topical analgesics as needed.\par NSAIDs prn. \par \par All questions answered, understanding verbalized. Patient in agreement with plan of care. Follow up as needed.

## 2022-09-29 NOTE — ADDENDUM
[FreeTextEntry1] : I, Yamila Granger wrote this note acting as a scribe for Dr. Chan Rosas on Sep 29, 2022.

## 2022-09-29 NOTE — HISTORY OF PRESENT ILLNESS
[Right] : right hand dominant [FreeTextEntry1] : Pt is a 46 y/o male c/o right ring finger pain x 4 months.  He cannot fully flex or extend the finger.  He does not have tenderness over the A1 pulley.  Denies triggering or locking.  He also c/o right thumb pain.  He has tenderness over the A1 pulley.  Denies triggering.  He has pain whenever he  anything.

## 2022-09-29 NOTE — PHYSICAL EXAM
[de-identified] : Patient is WDWN, alert, and in no acute distress. Breathing is unlabored. He is grossly oriented to person, place, and time.\par \par Right Hand:\par No tenderness over the A1 pulley of the right ring finger. He does however have tenderness over the A1 pulley of the right thumb. \par No focal tenderness radially or ulnarly at the MCP joint.\par He has pain on exam with ulnar deviation of the right ring finger.\par He is has limitations of flexion at the ring finger PIP and MCP joints. Full extension.\par Full digital motion otherwise.\par Sensation is intact to the digits.  [de-identified] : IMAGING AT [facility] FROM 09/06/2022 OF THE RIGHT RING FINGER\par IMPRESSION:\par 3 views --> no abnormalities. No acute fracture. No dislocation. Cartilage spaces are maintained.

## 2022-09-29 NOTE — END OF VISIT
[FreeTextEntry3] : All medical record entries made by the Scribe were at my,  Dr. Chan Rosas MD., direction and personally dictated by me on 09/29/2022. I have personally reviewed the chart and agree that the record accurately reflects my personal performance of the history, physical exam, assessment and plan.

## 2023-01-17 ENCOUNTER — NON-APPOINTMENT (OUTPATIENT)
Age: 48
End: 2023-01-17

## 2023-10-13 ENCOUNTER — NON-APPOINTMENT (OUTPATIENT)
Age: 48
End: 2023-10-13

## 2023-10-17 ENCOUNTER — NON-APPOINTMENT (OUTPATIENT)
Age: 48
End: 2023-10-17

## 2023-10-18 ENCOUNTER — NON-APPOINTMENT (OUTPATIENT)
Age: 48
End: 2023-10-18

## 2023-10-18 ENCOUNTER — APPOINTMENT (OUTPATIENT)
Dept: INTERNAL MEDICINE | Facility: CLINIC | Age: 48
End: 2023-10-18
Payer: COMMERCIAL

## 2023-10-18 VITALS
TEMPERATURE: 97.3 F | BODY MASS INDEX: 29.2 KG/M2 | WEIGHT: 204 LBS | SYSTOLIC BLOOD PRESSURE: 124 MMHG | OXYGEN SATURATION: 98 % | HEIGHT: 70 IN | HEART RATE: 78 BPM | DIASTOLIC BLOOD PRESSURE: 80 MMHG

## 2023-10-18 DIAGNOSIS — Z00.00 ENCOUNTER FOR GENERAL ADULT MEDICAL EXAMINATION W/OUT ABNORMAL FINDINGS: ICD-10-CM

## 2023-10-18 PROCEDURE — 82270 OCCULT BLOOD FECES: CPT

## 2023-10-18 PROCEDURE — 99396 PREV VISIT EST AGE 40-64: CPT | Mod: 25

## 2023-10-18 PROCEDURE — 93000 ELECTROCARDIOGRAM COMPLETE: CPT

## 2023-10-30 ENCOUNTER — APPOINTMENT (OUTPATIENT)
Dept: GASTROENTEROLOGY | Facility: CLINIC | Age: 48
End: 2023-10-30
Payer: COMMERCIAL

## 2023-10-30 ENCOUNTER — NON-APPOINTMENT (OUTPATIENT)
Age: 48
End: 2023-10-30

## 2023-10-30 DIAGNOSIS — Z86.010 PERSONAL HISTORY OF COLONIC POLYPS: ICD-10-CM

## 2023-10-30 PROCEDURE — 99442: CPT

## 2023-10-30 RX ORDER — SODIUM SULFATE, POTASSIUM SULFATE AND MAGNESIUM SULFATE 1.6; 3.13; 17.5 G/177ML; G/177ML; G/177ML
17.5-3.13-1.6 SOLUTION ORAL
Qty: 1 | Refills: 0 | Status: ACTIVE | COMMUNITY
Start: 2023-10-30 | End: 1900-01-01

## 2023-11-22 NOTE — PATIENT PROFILE ADULT - DOES PATIENT HAVE ADVANCE DIRECTIVE
Telemetry Verification   Patient placed on Telemetry Box  Verified with War Room  Box # [id number]   Monitor Tech Peri   Rate 64   Rhythm NSR      no

## 2023-11-29 ENCOUNTER — APPOINTMENT (OUTPATIENT)
Dept: GASTROENTEROLOGY | Facility: AMBULATORY MEDICAL SERVICES | Age: 48
End: 2023-11-29
Payer: COMMERCIAL

## 2023-11-29 PROCEDURE — 45385 COLONOSCOPY W/LESION REMOVAL: CPT | Mod: 33

## 2023-11-30 ENCOUNTER — OFFICE (OUTPATIENT)
Dept: URBAN - METROPOLITAN AREA CLINIC 27 | Facility: CLINIC | Age: 48
Setting detail: OPHTHALMOLOGY
End: 2023-11-30
Payer: MEDICAID

## 2023-11-30 DIAGNOSIS — H40.053: ICD-10-CM

## 2023-11-30 PROCEDURE — 76514 ECHO EXAM OF EYE THICKNESS: CPT | Performed by: OPHTHALMOLOGY

## 2023-11-30 PROCEDURE — 92012 INTRM OPH EXAM EST PATIENT: CPT | Performed by: OPHTHALMOLOGY

## 2023-11-30 PROCEDURE — 92133 CPTRZD OPH DX IMG PST SGM ON: CPT | Performed by: OPHTHALMOLOGY

## 2023-11-30 PROCEDURE — 92020 GONIOSCOPY: CPT | Performed by: OPHTHALMOLOGY

## 2023-11-30 ASSESSMENT — REFRACTION_AUTOREFRACTION
OS_SPHERE: -1.50
OD_CYLINDER: +0.50
OD_AXIS: 159
OS_CYLINDER: +1.25
OS_AXIS: 102
OD_SPHERE: -0.75

## 2023-11-30 ASSESSMENT — SPHEQUIV_DERIVED
OS_SPHEQUIV: -0.875
OD_SPHEQUIV: -0.5

## 2023-11-30 ASSESSMENT — CONFRONTATIONAL VISUAL FIELD TEST (CVF)
OS_FINDINGS: FULL
OD_FINDINGS: FULL

## 2023-11-30 ASSESSMENT — LID EXAM ASSESSMENTS: OD_COMMENTS: LID EVERSION

## 2023-12-21 ENCOUNTER — LABORATORY RESULT (OUTPATIENT)
Age: 48
End: 2023-12-21

## 2023-12-21 ENCOUNTER — APPOINTMENT (OUTPATIENT)
Dept: RHEUMATOLOGY | Facility: CLINIC | Age: 48
End: 2023-12-21
Payer: COMMERCIAL

## 2023-12-21 VITALS
HEIGHT: 70 IN | OXYGEN SATURATION: 98 % | BODY MASS INDEX: 28.92 KG/M2 | HEART RATE: 89 BPM | WEIGHT: 202 LBS | DIASTOLIC BLOOD PRESSURE: 85 MMHG | SYSTOLIC BLOOD PRESSURE: 128 MMHG

## 2023-12-21 DIAGNOSIS — M79.641 PAIN IN RIGHT HAND: ICD-10-CM

## 2023-12-21 DIAGNOSIS — M79.642 PAIN IN RIGHT HAND: ICD-10-CM

## 2023-12-21 DIAGNOSIS — Z15.89 GENETIC SUSCEPTIBILITY TO OTHER DISEASE: ICD-10-CM

## 2023-12-21 LAB
25(OH)D3 SERPL-MCNC: 37.1 NG/ML
ALBUMIN SERPL ELPH-MCNC: 4.9 G/DL
ALP BLD-CCNC: 64 U/L
ALT SERPL-CCNC: 52 U/L
ANION GAP SERPL CALC-SCNC: 13 MMOL/L
APPEARANCE: CLEAR
AST SERPL-CCNC: 23 U/L
BACTERIA: NEGATIVE /HPF
BASOPHILS # BLD AUTO: 0.04 K/UL
BASOPHILS NFR BLD AUTO: 0.6 %
BILIRUB SERPL-MCNC: 1 MG/DL
BILIRUBIN URINE: NEGATIVE
BLOOD URINE: NEGATIVE
BUN SERPL-MCNC: 17 MG/DL
CALCIUM SERPL-MCNC: 9.7 MG/DL
CAST: 0 /LPF
CHLORIDE SERPL-SCNC: 100 MMOL/L
CHOLEST SERPL-MCNC: 258 MG/DL
CO2 SERPL-SCNC: 25 MMOL/L
COLOR: YELLOW
CREAT SERPL-MCNC: 1.09 MG/DL
EGFR: 84 ML/MIN/1.73M2
EOSINOPHIL # BLD AUTO: 0.15 K/UL
EOSINOPHIL NFR BLD AUTO: 2.3 %
EPITHELIAL CELLS: 0 /HPF
ESTIMATED AVERAGE GLUCOSE: 108 MG/DL
GLUCOSE QUALITATIVE U: NEGATIVE MG/DL
GLUCOSE SERPL-MCNC: 102 MG/DL
HBA1C MFR BLD HPLC: 5.4 %
HCT VFR BLD CALC: 42.3 %
HDLC SERPL-MCNC: 70 MG/DL
HGB BLD-MCNC: 13.8 G/DL
IMM GRANULOCYTES NFR BLD AUTO: 0.5 %
KETONES URINE: NEGATIVE MG/DL
LDLC SERPL CALC-MCNC: 155 MG/DL
LEUKOCYTE ESTERASE URINE: NEGATIVE
LYMPHOCYTES # BLD AUTO: 2.46 K/UL
LYMPHOCYTES NFR BLD AUTO: 37.3 %
MAN DIFF?: NORMAL
MCHC RBC-ENTMCNC: 29.7 PG
MCHC RBC-ENTMCNC: 32.6 GM/DL
MCV RBC AUTO: 91 FL
MICROSCOPIC-UA: NORMAL
MONOCYTES # BLD AUTO: 0.49 K/UL
MONOCYTES NFR BLD AUTO: 7.4 %
NEUTROPHILS # BLD AUTO: 3.42 K/UL
NEUTROPHILS NFR BLD AUTO: 51.9 %
NITRITE URINE: NEGATIVE
NONHDLC SERPL-MCNC: 188 MG/DL
PH URINE: 6
PLATELET # BLD AUTO: 259 K/UL
POTASSIUM SERPL-SCNC: 4.1 MMOL/L
PROT SERPL-MCNC: 7.1 G/DL
PROTEIN URINE: NEGATIVE MG/DL
RBC # BLD: 4.65 M/UL
RBC # FLD: 12.3 %
RED BLOOD CELLS URINE: 0 /HPF
SODIUM SERPL-SCNC: 138 MMOL/L
SPECIFIC GRAVITY URINE: 1.01
T4 FREE SERPL-MCNC: 1.4 NG/DL
TRIGL SERPL-MCNC: 184 MG/DL
TSH SERPL-ACNC: 1.94 UIU/ML
UROBILINOGEN URINE: 0.2 MG/DL
VIT B12 SERPL-MCNC: 608 PG/ML
WBC # FLD AUTO: 6.59 K/UL
WHITE BLOOD CELLS URINE: 0 /HPF

## 2023-12-21 PROCEDURE — 99204 OFFICE O/P NEW MOD 45 MIN: CPT

## 2023-12-21 NOTE — HEALTH RISK ASSESSMENT
[de-identified] : Patient is a runner, 3x/week, 8-12 miles, 2-3x/week gym (90 min). [[id number]] : 0 [Change in mental status noted] : No change in mental status noted [Language] : denies difficulty with language [Handling Complex Tasks] : denies difficulty handling complex tasks [Behavior] : denies difficulty with behavior [Reasoning] : denies difficulty with reasoning [High Risk Behavior] : no high risk behavior [Reports changes in hearing] : Reports no changes in hearing [Reports changes in dental health] : Reports no changes in dental health [ColonoscopyDate] : 01/18 [ColonoscopyComments] : 1/10/2018 - Dr. Gallo Horan, 1 adenoma, 5 year follow-up recommended. 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  Formerly Prescription Reading glasses - started in 2019.  Astigmatism - has distance glasses for night driving.  Goes to [employer]. [de-identified] : Going regularly. [AdvancecareDate] : 10/18/2023 [FreeTextEntry4] : Patient given Health Care Proxy information today.

## 2023-12-21 NOTE — HISTORY OF PRESENT ILLNESS
[de-identified] : Patient presents for a follow-up annual physical.  Patient is a 48-year-old male with a history of Factor V Leiden carrier, acute appendicitis with rupture, liver abscess, torn meniscus.  Patient was seen in the [place] ER October 2020 with abdominal pains.  He had elevated transaminases.  A CT Abd/pelvis and HIDA scan were negative.  Patient did not have further episodes.  He has had chronic diarrhea since then which improved.  Patient saw Dr. Cunha in August 2021 ([facility]).  He got cyclobenzaprine, Percocet, prednisone.  He was found to have several herniated discs.  These symptoms improved.  Patient gained a lot of weight as his exercise routine stopped due to the pandemic.  He just re-joined a gym October 2021.  Patient's spouse got COVID-19 in September 2021, and then the patient got COVID-19 himself late September 2021.  Patient had mild symptoms of chills, with no fever.  No loss of taste or smell.  He had a lot of back aches, and felt tired.  He felt that breathing was somewhat of a challenge.  His O2 sat was normal.  He improved after about 10 days.  He declines the COVID-19 vaccine because his spouse is strongly against it.  He declines influenza vaccination and COVID-19 boosters.

## 2023-12-21 NOTE — ASSESSMENT
[FreeTextEntry1] : (1) HCM - discussed diet, exercise, weight maintenance. Labs ordered in office as below.  HIV testing declined.  Stool guaiac performed by MD and was negative. Patient declines the COVID-19 vaccination and influenza vaccination. Tdap was given 8/16/16. Screening colonoscopy 1/10/18 with Dr. Gallo Horan showed 1 adenoma, with 5-year follow-up recommended.  Patient to call for follow-up colonoscopy.  Patient given Health Care Proxy information today.  (2) GI - patient is s/p appendicitis and small hepatic abscess in 2019, and in 2020 he had an ER visit for abdominal pains with elevated LFTs (negative CT and HIDA).  He had recurrent diarrhea afterwards, which has improved.  He will be seeing GI for a screening colonoscopy, and will follow-up for recurrent symptoms.

## 2024-01-01 PROBLEM — Z15.89 HLA-B27 POSITIVE: Status: ACTIVE | Noted: 2024-01-01

## 2024-01-01 LAB
ALBUMIN SERPL ELPH-MCNC: 4.7 G/DL
ALP BLD-CCNC: 66 U/L
ALT SERPL-CCNC: 59 U/L
ANION GAP SERPL CALC-SCNC: 15 MMOL/L
AST SERPL-CCNC: 24 U/L
BASOPHILS # BLD AUTO: 0.05 K/UL
BASOPHILS NFR BLD AUTO: 0.7 %
BILIRUB SERPL-MCNC: 1.2 MG/DL
BUN SERPL-MCNC: 18 MG/DL
CALCIUM SERPL-MCNC: 9.4 MG/DL
CCP AB SER IA-ACNC: <8 UNITS
CHLORIDE SERPL-SCNC: 102 MMOL/L
CO2 SERPL-SCNC: 24 MMOL/L
CREAT SERPL-MCNC: 0.99 MG/DL
EGFR: 94 ML/MIN/1.73M2
EOSINOPHIL # BLD AUTO: 0.08 K/UL
EOSINOPHIL NFR BLD AUTO: 1.2 %
ERYTHROCYTE [SEDIMENTATION RATE] IN BLOOD BY WESTERGREN METHOD: < 2 MM/HR
GLUCOSE SERPL-MCNC: 97 MG/DL
HCT VFR BLD CALC: 40.5 %
HGB BLD-MCNC: 14.3 G/DL
HLA-B27 QL NAA+PROBE: NORMAL
IMM GRANULOCYTES NFR BLD AUTO: 0.3 %
LYMPHOCYTES # BLD AUTO: 2.23 K/UL
LYMPHOCYTES NFR BLD AUTO: 32.5 %
MAN DIFF?: NORMAL
MCHC RBC-ENTMCNC: 31.3 PG
MCHC RBC-ENTMCNC: 35.3 GM/DL
MCV RBC AUTO: 88.6 FL
MONOCYTES # BLD AUTO: 0.51 K/UL
MONOCYTES NFR BLD AUTO: 7.4 %
NEUTROPHILS # BLD AUTO: 3.97 K/UL
NEUTROPHILS NFR BLD AUTO: 57.9 %
PLATELET # BLD AUTO: 279 K/UL
POTASSIUM SERPL-SCNC: 3.9 MMOL/L
PROT SERPL-MCNC: 7 G/DL
RBC # BLD: 4.57 M/UL
RBC # FLD: 12.2 %
RF+CCP IGG SER-IMP: NEGATIVE
RHEUMATOID FACT SER QL: <10 IU/ML
SODIUM SERPL-SCNC: 140 MMOL/L
WBC # FLD AUTO: 6.86 K/UL

## 2024-01-01 NOTE — PHYSICAL EXAM
[General Appearance - Alert] : alert [General Appearance - In No Acute Distress] : in no acute distress [General Appearance - Well Nourished] : well nourished [Sclera] : the sclera and conjunctiva were normal [Nasal Cavity] : the nasal mucosa and septum were normal [Musculoskeletal - Swelling] : no joint swelling seen [] : no rash [Sensation] : the sensory exam was normal to light touch and pinprick [Motor Exam] : the motor exam was normal [Oriented To Time, Place, And Person] : oriented to person, place, and time

## 2024-01-01 NOTE — ASSESSMENT
[FreeTextEntry1] : 48M with no PMH coming for episodic hand arthritis, now asymptomatic. 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 Family history of Crohn's disease (brother) may suggest future spondyloarthritis or other associated condition  Plan: -check inflammatory arthritis labs -check xrays of hands, US hands to assess for subclinical synovitis -NSAID as needed -RTC as needed after above

## 2024-01-01 NOTE — HISTORY OF PRESENT ILLNESS
[FreeTextEntry1] : 48M coming for evaluation of hand pain.  Past 2 Summers with hand pain, difficulty making a fist. Several digits were locking. Morning stiffness of 10min. Stretching of the hands helps, hot water helps. Notes mid swelling in hands. No numbness or tingling. His symptoms have  Seen by orthopedist and recommended to see a rheumatologist. No preceding URI; no h/o pso, ibd, uveitis.  Occupation: office job FH: mother and grandmother "arthritis". Brother Crohns disease

## 2024-01-09 ENCOUNTER — OUTPATIENT (OUTPATIENT)
Dept: OUTPATIENT SERVICES | Facility: HOSPITAL | Age: 49
LOS: 1 days | End: 2024-01-09
Payer: COMMERCIAL

## 2024-01-09 ENCOUNTER — APPOINTMENT (OUTPATIENT)
Dept: RADIOLOGY | Facility: CLINIC | Age: 49
End: 2024-01-09
Payer: COMMERCIAL

## 2024-01-09 ENCOUNTER — APPOINTMENT (OUTPATIENT)
Dept: ULTRASOUND IMAGING | Facility: CLINIC | Age: 49
End: 2024-01-09
Payer: COMMERCIAL

## 2024-01-09 DIAGNOSIS — Z90.49 ACQUIRED ABSENCE OF OTHER SPECIFIED PARTS OF DIGESTIVE TRACT: Chronic | ICD-10-CM

## 2024-01-09 DIAGNOSIS — M79.641 PAIN IN RIGHT HAND: ICD-10-CM

## 2024-01-09 PROCEDURE — 76881 US COMPL JOINT R-T W/IMG: CPT | Mod: 26,LT

## 2024-01-09 PROCEDURE — 73130 X-RAY EXAM OF HAND: CPT | Mod: 26,50

## 2024-01-09 PROCEDURE — 76881 US COMPL JOINT R-T W/IMG: CPT

## 2024-01-09 PROCEDURE — 73130 X-RAY EXAM OF HAND: CPT

## 2024-01-09 PROCEDURE — 76881 US COMPL JOINT R-T W/IMG: CPT | Mod: 26,RT

## 2024-01-10 ENCOUNTER — TRANSCRIPTION ENCOUNTER (OUTPATIENT)
Age: 49
End: 2024-01-10

## 2024-01-17 ENCOUNTER — OFFICE (OUTPATIENT)
Dept: URBAN - METROPOLITAN AREA CLINIC 27 | Facility: CLINIC | Age: 49
Setting detail: OPHTHALMOLOGY
End: 2024-01-17
Payer: MEDICAID

## 2024-01-17 DIAGNOSIS — H40.053: ICD-10-CM

## 2024-01-17 PROCEDURE — 92083 EXTENDED VISUAL FIELD XM: CPT | Performed by: OPHTHALMOLOGY

## 2024-01-17 PROCEDURE — 92012 INTRM OPH EXAM EST PATIENT: CPT | Performed by: OPHTHALMOLOGY

## 2024-01-17 ASSESSMENT — SPHEQUIV_DERIVED
OD_SPHEQUIV: -0.625
OS_SPHEQUIV: -0.875

## 2024-01-17 ASSESSMENT — REFRACTION_AUTOREFRACTION
OD_SPHERE: -0.75
OS_AXIS: 107
OD_CYLINDER: +0.25
OS_SPHERE: -1.50
OD_AXIS: 140
OS_CYLINDER: +1.25

## 2024-01-17 ASSESSMENT — CONFRONTATIONAL VISUAL FIELD TEST (CVF)
OS_FINDINGS: FULL
OD_FINDINGS: FULL

## 2024-01-17 ASSESSMENT — LID EXAM ASSESSMENTS: OD_COMMENTS: LID EVERSION

## 2024-12-04 ENCOUNTER — NON-APPOINTMENT (OUTPATIENT)
Age: 49
End: 2024-12-04

## 2024-12-04 ENCOUNTER — APPOINTMENT (OUTPATIENT)
Dept: INTERNAL MEDICINE | Facility: CLINIC | Age: 49
End: 2024-12-04
Payer: COMMERCIAL

## 2024-12-04 VITALS
OXYGEN SATURATION: 98 % | SYSTOLIC BLOOD PRESSURE: 122 MMHG | TEMPERATURE: 99.3 F | WEIGHT: 167 LBS | HEART RATE: 63 BPM | DIASTOLIC BLOOD PRESSURE: 78 MMHG | BODY MASS INDEX: 23.91 KG/M2 | HEIGHT: 70 IN

## 2024-12-04 DIAGNOSIS — M67.40 GANGLION, UNSPECIFIED SITE: ICD-10-CM

## 2024-12-04 DIAGNOSIS — Z00.00 ENCOUNTER FOR GENERAL ADULT MEDICAL EXAMINATION W/OUT ABNORMAL FINDINGS: ICD-10-CM

## 2024-12-04 PROCEDURE — 99396 PREV VISIT EST AGE 40-64: CPT

## 2024-12-04 PROCEDURE — 93000 ELECTROCARDIOGRAM COMPLETE: CPT

## 2024-12-05 LAB
25(OH)D3 SERPL-MCNC: 42.4 NG/ML
ALBUMIN SERPL ELPH-MCNC: 4.8 G/DL
ALP BLD-CCNC: 65 U/L
ALT SERPL-CCNC: 39 U/L
ANION GAP SERPL CALC-SCNC: 15 MMOL/L
APPEARANCE: CLEAR
AST SERPL-CCNC: 28 U/L
BACTERIA: NEGATIVE /HPF
BASOPHILS # BLD AUTO: 0.03 K/UL
BASOPHILS NFR BLD AUTO: 0.5 %
BILIRUB SERPL-MCNC: 1.3 MG/DL
BILIRUBIN URINE: NEGATIVE
BLOOD URINE: NEGATIVE
BUN SERPL-MCNC: 15 MG/DL
CALCIUM SERPL-MCNC: 9.5 MG/DL
CAST: 0 /LPF
CHLORIDE SERPL-SCNC: 100 MMOL/L
CHOLEST SERPL-MCNC: 227 MG/DL
CO2 SERPL-SCNC: 22 MMOL/L
COLOR: YELLOW
CREAT SERPL-MCNC: 1 MG/DL
EGFR: 92 ML/MIN/1.73M2
EOSINOPHIL # BLD AUTO: 0.06 K/UL
EOSINOPHIL NFR BLD AUTO: 1 %
EPITHELIAL CELLS: 0 /HPF
ESTIMATED AVERAGE GLUCOSE: 108 MG/DL
GLUCOSE QUALITATIVE U: NEGATIVE MG/DL
GLUCOSE SERPL-MCNC: 108 MG/DL
HBA1C MFR BLD HPLC: 5.4 %
HCT VFR BLD CALC: 41.5 %
HDLC SERPL-MCNC: 88 MG/DL
HGB BLD-MCNC: 13.9 G/DL
IMM GRANULOCYTES NFR BLD AUTO: 0.3 %
KETONES URINE: NEGATIVE MG/DL
LDLC SERPL CALC-MCNC: 127 MG/DL
LEUKOCYTE ESTERASE URINE: NEGATIVE
LYMPHOCYTES # BLD AUTO: 2.03 K/UL
LYMPHOCYTES NFR BLD AUTO: 35.5 %
MAN DIFF?: NORMAL
MCHC RBC-ENTMCNC: 31.2 PG
MCHC RBC-ENTMCNC: 33.5 G/DL
MCV RBC AUTO: 93 FL
MICROSCOPIC-UA: NORMAL
MONOCYTES # BLD AUTO: 0.39 K/UL
MONOCYTES NFR BLD AUTO: 6.8 %
NEUTROPHILS # BLD AUTO: 3.19 K/UL
NEUTROPHILS NFR BLD AUTO: 55.9 %
NITRITE URINE: NEGATIVE
NONHDLC SERPL-MCNC: 139 MG/DL
PH URINE: 6
PLATELET # BLD AUTO: 249 K/UL
POTASSIUM SERPL-SCNC: 4.2 MMOL/L
PROT SERPL-MCNC: 7.1 G/DL
PROTEIN URINE: NEGATIVE MG/DL
PSA SERPL-MCNC: 0.66 NG/ML
RBC # BLD: 4.46 M/UL
RBC # FLD: 13 %
RED BLOOD CELLS URINE: 0 /HPF
SODIUM SERPL-SCNC: 137 MMOL/L
SPECIFIC GRAVITY URINE: 1.01
T4 FREE SERPL-MCNC: 1.2 NG/DL
TRIGL SERPL-MCNC: 67 MG/DL
TSH SERPL-ACNC: 1.46 UIU/ML
UROBILINOGEN URINE: 0.2 MG/DL
VIT B12 SERPL-MCNC: 509 PG/ML
WBC # FLD AUTO: 5.72 K/UL
WHITE BLOOD CELLS URINE: 0 /HPF

## 2025-01-15 ENCOUNTER — APPOINTMENT (OUTPATIENT)
Dept: ORTHOPEDIC SURGERY | Facility: CLINIC | Age: 50
End: 2025-01-15
Payer: COMMERCIAL

## 2025-01-15 DIAGNOSIS — M79.89 OTHER SPECIFIED SOFT TISSUE DISORDERS: ICD-10-CM

## 2025-01-15 DIAGNOSIS — M67.431 GANGLION, RIGHT WRIST: ICD-10-CM

## 2025-01-15 PROCEDURE — 99213 OFFICE O/P EST LOW 20 MIN: CPT

## 2025-01-15 PROCEDURE — 73130 X-RAY EXAM OF HAND: CPT | Mod: LT

## 2025-04-09 ENCOUNTER — APPOINTMENT (OUTPATIENT)
Dept: INTERNAL MEDICINE | Facility: CLINIC | Age: 50
End: 2025-04-09
Payer: COMMERCIAL

## 2025-04-09 VITALS
OXYGEN SATURATION: 99 % | TEMPERATURE: 98.5 F | WEIGHT: 174 LBS | HEIGHT: 70 IN | DIASTOLIC BLOOD PRESSURE: 82 MMHG | HEART RATE: 62 BPM | SYSTOLIC BLOOD PRESSURE: 130 MMHG | BODY MASS INDEX: 24.91 KG/M2

## 2025-04-09 DIAGNOSIS — M25.519 PAIN IN UNSPECIFIED SHOULDER: ICD-10-CM

## 2025-04-09 PROCEDURE — G2211 COMPLEX E/M VISIT ADD ON: CPT

## 2025-04-09 PROCEDURE — 99213 OFFICE O/P EST LOW 20 MIN: CPT

## 2025-04-10 ENCOUNTER — APPOINTMENT (OUTPATIENT)
Dept: ORTHOPEDIC SURGERY | Facility: CLINIC | Age: 50
End: 2025-04-10
Payer: COMMERCIAL

## 2025-04-10 DIAGNOSIS — M75.21 BICIPITAL TENDINITIS, RIGHT SHOULDER: ICD-10-CM

## 2025-04-10 PROCEDURE — 99204 OFFICE O/P NEW MOD 45 MIN: CPT

## 2025-04-10 RX ORDER — MELOXICAM 7.5 MG/1
7.5 TABLET ORAL
Qty: 30 | Refills: 0 | Status: ACTIVE | COMMUNITY
Start: 2025-04-10 | End: 1900-01-01